# Patient Record
Sex: MALE | Employment: OTHER | URBAN - METROPOLITAN AREA
[De-identification: names, ages, dates, MRNs, and addresses within clinical notes are randomized per-mention and may not be internally consistent; named-entity substitution may affect disease eponyms.]

---

## 2019-12-08 ENCOUNTER — HOSPITAL ENCOUNTER (EMERGENCY)
Age: 79
Discharge: HOME | End: 2019-12-08
Attending: EMERGENCY MEDICINE
Payer: MEDICARE

## 2019-12-08 ENCOUNTER — APPOINTMENT (OUTPATIENT)
Dept: CT IMAGING | Age: 79
End: 2019-12-08
Payer: MEDICARE

## 2019-12-08 VITALS
DIASTOLIC BLOOD PRESSURE: 67 MMHG | TEMPERATURE: 97.4 F | HEART RATE: 100 BPM | BODY MASS INDEX: 42 KG/M2 | SYSTOLIC BLOOD PRESSURE: 141 MMHG | OXYGEN SATURATION: 96 % | HEIGHT: 71 IN | WEIGHT: 300 LBS | RESPIRATION RATE: 17 BRPM

## 2019-12-08 DIAGNOSIS — R00.0 TACHYCARDIA: ICD-10-CM

## 2019-12-08 DIAGNOSIS — R07.9 CHEST PAIN, UNSPECIFIED TYPE: Primary | ICD-10-CM

## 2019-12-08 LAB
ANION GAP SERPL CALCULATED.3IONS-SCNC: 21 MMOL/L (ref 7–16)
APTT: 40.8 SEC (ref 24.5–35.1)
BASOPHILS ABSOLUTE: 0.06 E9/L (ref 0–0.2)
BASOPHILS RELATIVE PERCENT: 0.5 % (ref 0–2)
BUN BLDV-MCNC: 26 MG/DL (ref 8–23)
CALCIUM SERPL-MCNC: 9.7 MG/DL (ref 8.6–10.2)
CHLORIDE BLD-SCNC: 101 MMOL/L (ref 98–107)
CO2: 17 MMOL/L (ref 22–29)
CO2: 20 MMOL/L (ref 22–29)
CREAT SERPL-MCNC: 1.1 MG/DL (ref 0.7–1.2)
EOSINOPHILS ABSOLUTE: 0.2 E9/L (ref 0.05–0.5)
EOSINOPHILS RELATIVE PERCENT: 1.6 % (ref 0–6)
GFR AFRICAN AMERICAN: >60
GFR AFRICAN AMERICAN: >60
GFR NON-AFRICAN AMERICAN: >60 ML/MIN/1.73
GFR NON-AFRICAN AMERICAN: >60 ML/MIN/1.73
GLUCOSE BLD-MCNC: 277 MG/DL (ref 74–99)
GLUCOSE BLD-MCNC: 285 MG/DL (ref 74–99)
HCT VFR BLD CALC: 47.5 % (ref 37–54)
HEMOGLOBIN: 15.7 G/DL (ref 12.5–16.5)
IMMATURE GRANULOCYTES #: 0.1 E9/L
IMMATURE GRANULOCYTES %: 0.8 % (ref 0–5)
INR BLD: 0.9
LYMPHOCYTES ABSOLUTE: 4.61 E9/L (ref 1.5–4)
LYMPHOCYTES RELATIVE PERCENT: 37.7 % (ref 20–42)
MCH RBC QN AUTO: 30.1 PG (ref 26–35)
MCHC RBC AUTO-ENTMCNC: 33.1 % (ref 32–34.5)
MCV RBC AUTO: 91 FL (ref 80–99.9)
MONOCYTES ABSOLUTE: 1.03 E9/L (ref 0.1–0.95)
MONOCYTES RELATIVE PERCENT: 8.4 % (ref 2–12)
NEUTROPHILS ABSOLUTE: 6.22 E9/L (ref 1.8–7.3)
NEUTROPHILS RELATIVE PERCENT: 51 % (ref 43–80)
PDW BLD-RTO: 13.5 FL (ref 11.5–15)
PLATELET # BLD: 248 E9/L (ref 130–450)
PMV BLD AUTO: 10.6 FL (ref 7–12)
POC ANION GAP: 10 MMOL/L (ref 7–16)
POC BUN: 32 MG/DL (ref 8–23)
POC CHLORIDE: 106 MMOL/L (ref 100–108)
POC CREATININE: 1.1 MG/DL (ref 0.7–1.2)
POC POTASSIUM: 4.2 MMOL/L (ref 3.5–5)
POC SODIUM: 136 MMOL/L (ref 132–146)
POTASSIUM REFLEX MAGNESIUM: 4.4 MMOL/L (ref 3.5–5)
PRO-BNP: 52 PG/ML (ref 0–450)
PROTHROMBIN TIME: 10.4 SEC (ref 9.3–12.4)
RBC # BLD: 5.22 E12/L (ref 3.8–5.8)
SODIUM BLD-SCNC: 139 MMOL/L (ref 132–146)
T4 TOTAL: 7.5 MCG/DL (ref 4.5–11.7)
TROPONIN: <0.01 NG/ML (ref 0–0.03)
TSH SERPL DL<=0.05 MIU/L-ACNC: 8.5 UIU/ML (ref 0.27–4.2)
WBC # BLD: 12.2 E9/L (ref 4.5–11.5)

## 2019-12-08 PROCEDURE — 36415 COLL VENOUS BLD VENIPUNCTURE: CPT

## 2019-12-08 PROCEDURE — 82565 ASSAY OF CREATININE: CPT

## 2019-12-08 PROCEDURE — 85610 PROTHROMBIN TIME: CPT

## 2019-12-08 PROCEDURE — 6360000004 HC RX CONTRAST MEDICATION: Performed by: RADIOLOGY

## 2019-12-08 PROCEDURE — 82947 ASSAY GLUCOSE BLOOD QUANT: CPT

## 2019-12-08 PROCEDURE — 2580000003 HC RX 258: Performed by: EMERGENCY MEDICINE

## 2019-12-08 PROCEDURE — 84443 ASSAY THYROID STIM HORMONE: CPT

## 2019-12-08 PROCEDURE — 83880 ASSAY OF NATRIURETIC PEPTIDE: CPT

## 2019-12-08 PROCEDURE — 99285 EMERGENCY DEPT VISIT HI MDM: CPT

## 2019-12-08 PROCEDURE — 80051 ELECTROLYTE PANEL: CPT

## 2019-12-08 PROCEDURE — 93005 ELECTROCARDIOGRAM TRACING: CPT | Performed by: EMERGENCY MEDICINE

## 2019-12-08 PROCEDURE — 85025 COMPLETE CBC W/AUTO DIFF WBC: CPT

## 2019-12-08 PROCEDURE — 84484 ASSAY OF TROPONIN QUANT: CPT

## 2019-12-08 PROCEDURE — 71275 CT ANGIOGRAPHY CHEST: CPT

## 2019-12-08 PROCEDURE — 84520 ASSAY OF UREA NITROGEN: CPT

## 2019-12-08 PROCEDURE — 80048 BASIC METABOLIC PNL TOTAL CA: CPT

## 2019-12-08 PROCEDURE — 85730 THROMBOPLASTIN TIME PARTIAL: CPT

## 2019-12-08 PROCEDURE — 84436 ASSAY OF TOTAL THYROXINE: CPT

## 2019-12-08 RX ORDER — IBUPROFEN 200 MG
200 TABLET ORAL EVERY 6 HOURS PRN
COMMUNITY

## 2019-12-08 RX ORDER — 0.9 % SODIUM CHLORIDE 0.9 %
1000 INTRAVENOUS SOLUTION INTRAVENOUS ONCE
Status: COMPLETED | OUTPATIENT
Start: 2019-12-08 | End: 2019-12-08

## 2019-12-08 RX ORDER — METFORMIN HYDROCHLORIDE 500 MG/1
1000 TABLET, EXTENDED RELEASE ORAL
COMMUNITY

## 2019-12-08 RX ORDER — LISINOPRIL AND HYDROCHLOROTHIAZIDE 20; 12.5 MG/1; MG/1
1 TABLET ORAL DAILY
COMMUNITY

## 2019-12-08 RX ORDER — OMEPRAZOLE 40 MG/1
40 CAPSULE, DELAYED RELEASE ORAL DAILY
COMMUNITY

## 2019-12-08 RX ORDER — GLIPIZIDE 5 MG/1
5 TABLET ORAL
COMMUNITY

## 2019-12-08 RX ORDER — ASPIRIN 81 MG/1
81 TABLET, CHEWABLE ORAL DAILY
COMMUNITY

## 2019-12-08 RX ADMIN — SODIUM CHLORIDE 1000 ML: 9 INJECTION, SOLUTION INTRAVENOUS at 18:17

## 2019-12-08 RX ADMIN — IOPAMIDOL 80 ML: 755 INJECTION, SOLUTION INTRAVENOUS at 18:33

## 2019-12-09 LAB
EKG ATRIAL RATE: 106 BPM
EKG P AXIS: 71 DEGREES
EKG P-R INTERVAL: 192 MS
EKG Q-T INTERVAL: 322 MS
EKG QRS DURATION: 86 MS
EKG QTC CALCULATION (BAZETT): 427 MS
EKG R AXIS: 41 DEGREES
EKG T AXIS: 39 DEGREES
EKG VENTRICULAR RATE: 106 BPM

## 2019-12-09 PROCEDURE — 93010 ELECTROCARDIOGRAM REPORT: CPT | Performed by: INTERNAL MEDICINE

## 2023-05-03 LAB — SARS-COV-2 RESULT: NOT DETECTED

## 2023-09-01 PROBLEM — Z98.890: Status: ACTIVE | Noted: 2023-09-01

## 2023-09-01 PROBLEM — C77.9 MALIGNANT MELANOMA METASTATIC TO LYMPH NODE (MULTI): Status: ACTIVE | Noted: 2023-09-01

## 2023-09-01 PROBLEM — C43.4 MALIGNANT MELANOMA OF HEAD AND NECK (MULTI): Status: ACTIVE | Noted: 2023-09-01

## 2023-09-01 RX ORDER — METFORMIN HYDROCHLORIDE 500 MG/1
500 TABLET ORAL
COMMUNITY
End: 2024-05-24 | Stop reason: SDUPTHER

## 2023-09-01 RX ORDER — ATORVASTATIN CALCIUM 10 MG/1
10 TABLET, FILM COATED ORAL DAILY
COMMUNITY

## 2023-09-01 RX ORDER — LISINOPRIL 20 MG/1
20 TABLET ORAL DAILY
COMMUNITY

## 2023-09-01 RX ORDER — GLIPIZIDE 5 MG/1
5 TABLET ORAL
COMMUNITY
End: 2024-06-06 | Stop reason: ALTCHOICE

## 2023-09-01 RX ORDER — METOPROLOL SUCCINATE 100 MG/1
100 TABLET, EXTENDED RELEASE ORAL DAILY
COMMUNITY

## 2023-09-01 RX ORDER — METOPROLOL SUCCINATE 50 MG/1
50 TABLET, EXTENDED RELEASE ORAL DAILY
COMMUNITY

## 2023-09-12 ENCOUNTER — OFFICE VISIT (OUTPATIENT)
Dept: PRIMARY CARE | Facility: CLINIC | Age: 83
End: 2023-09-12
Payer: MEDICARE

## 2023-09-12 VITALS
HEIGHT: 72 IN | OXYGEN SATURATION: 99 % | TEMPERATURE: 96.8 F | DIASTOLIC BLOOD PRESSURE: 84 MMHG | WEIGHT: 289 LBS | BODY MASS INDEX: 39.14 KG/M2 | RESPIRATION RATE: 16 BRPM | HEART RATE: 74 BPM | SYSTOLIC BLOOD PRESSURE: 126 MMHG

## 2023-09-12 DIAGNOSIS — I48.91 ATRIAL FIBRILLATION, UNSPECIFIED TYPE (MULTI): ICD-10-CM

## 2023-09-12 DIAGNOSIS — I10 PRIMARY HYPERTENSION: ICD-10-CM

## 2023-09-12 DIAGNOSIS — E11.9 TYPE 2 DIABETES MELLITUS WITHOUT COMPLICATION, WITHOUT LONG-TERM CURRENT USE OF INSULIN (MULTI): Primary | ICD-10-CM

## 2023-09-12 DIAGNOSIS — E66.9 CLASS 2 OBESITY WITHOUT SERIOUS COMORBIDITY WITH BODY MASS INDEX (BMI) OF 39.0 TO 39.9 IN ADULT, UNSPECIFIED OBESITY TYPE: ICD-10-CM

## 2023-09-12 DIAGNOSIS — E78.49 OTHER HYPERLIPIDEMIA: ICD-10-CM

## 2023-09-12 DIAGNOSIS — M25.511 BILATERAL SHOULDER PAIN, UNSPECIFIED CHRONICITY: ICD-10-CM

## 2023-09-12 DIAGNOSIS — Z76.89 ENCOUNTER TO ESTABLISH CARE WITH NEW DOCTOR: ICD-10-CM

## 2023-09-12 DIAGNOSIS — M25.512 BILATERAL SHOULDER PAIN, UNSPECIFIED CHRONICITY: ICD-10-CM

## 2023-09-12 PROCEDURE — 3079F DIAST BP 80-89 MM HG: CPT | Performed by: STUDENT IN AN ORGANIZED HEALTH CARE EDUCATION/TRAINING PROGRAM

## 2023-09-12 PROCEDURE — 1125F AMNT PAIN NOTED PAIN PRSNT: CPT | Performed by: STUDENT IN AN ORGANIZED HEALTH CARE EDUCATION/TRAINING PROGRAM

## 2023-09-12 PROCEDURE — 1159F MED LIST DOCD IN RCRD: CPT | Performed by: STUDENT IN AN ORGANIZED HEALTH CARE EDUCATION/TRAINING PROGRAM

## 2023-09-12 PROCEDURE — 99203 OFFICE O/P NEW LOW 30 MIN: CPT | Performed by: STUDENT IN AN ORGANIZED HEALTH CARE EDUCATION/TRAINING PROGRAM

## 2023-09-12 PROCEDURE — 1036F TOBACCO NON-USER: CPT | Performed by: STUDENT IN AN ORGANIZED HEALTH CARE EDUCATION/TRAINING PROGRAM

## 2023-09-12 PROCEDURE — 3074F SYST BP LT 130 MM HG: CPT | Performed by: STUDENT IN AN ORGANIZED HEALTH CARE EDUCATION/TRAINING PROGRAM

## 2023-09-12 PROCEDURE — 1160F RVW MEDS BY RX/DR IN RCRD: CPT | Performed by: STUDENT IN AN ORGANIZED HEALTH CARE EDUCATION/TRAINING PROGRAM

## 2023-09-12 RX ORDER — DULAGLUTIDE 0.75 MG/.5ML
0.75 INJECTION, SOLUTION SUBCUTANEOUS
COMMUNITY
Start: 2023-07-15 | End: 2024-02-19 | Stop reason: SDUPTHER

## 2023-09-12 SDOH — ECONOMIC STABILITY: FOOD INSECURITY: WITHIN THE PAST 12 MONTHS, THE FOOD YOU BOUGHT JUST DIDN'T LAST AND YOU DIDN'T HAVE MONEY TO GET MORE.: NEVER TRUE

## 2023-09-12 SDOH — ECONOMIC STABILITY: FOOD INSECURITY: WITHIN THE PAST 12 MONTHS, YOU WORRIED THAT YOUR FOOD WOULD RUN OUT BEFORE YOU GOT MONEY TO BUY MORE.: NEVER TRUE

## 2023-09-12 ASSESSMENT — ENCOUNTER SYMPTOMS
COLOR CHANGE: 0
NAUSEA: 0
ARTHRALGIAS: 1
COUGH: 0
CHILLS: 0
PALPITATIONS: 0
ABDOMINAL PAIN: 0
SHORTNESS OF BREATH: 0
OCCASIONAL FEELINGS OF UNSTEADINESS: 1
WHEEZING: 0
CONFUSION: 0
VOMITING: 0
CONSTIPATION: 0
HEADACHES: 0
FATIGUE: 0
DIARRHEA: 0
UNEXPECTED WEIGHT CHANGE: 0
DEPRESSION: 0
LOSS OF SENSATION IN FEET: 0
DIZZINESS: 0
FEVER: 0

## 2023-09-12 ASSESSMENT — LIFESTYLE VARIABLES
AUDIT-C TOTAL SCORE: 1
HOW MANY STANDARD DRINKS CONTAINING ALCOHOL DO YOU HAVE ON A TYPICAL DAY: 1 OR 2
SKIP TO QUESTIONS 9-10: 1
HOW OFTEN DO YOU HAVE SIX OR MORE DRINKS ON ONE OCCASION: NEVER
HOW OFTEN DO YOU HAVE A DRINK CONTAINING ALCOHOL: MONTHLY OR LESS

## 2023-09-12 ASSESSMENT — PATIENT HEALTH QUESTIONNAIRE - PHQ9
1. LITTLE INTEREST OR PLEASURE IN DOING THINGS: NOT AT ALL
SUM OF ALL RESPONSES TO PHQ9 QUESTIONS 1 & 2: 0
2. FEELING DOWN, DEPRESSED OR HOPELESS: NOT AT ALL

## 2023-09-12 ASSESSMENT — PAIN SCALES - GENERAL: PAINLEVEL: 6

## 2023-09-12 NOTE — PROGRESS NOTES
Subjective   Patient ID: Ji Naidu is a 83 y.o. male who presents for New Patient Visit and Pain (Muscles and joints started right deltoid moved to left side-unable to comb hair no injury reported. ).    HPI   He is a new pt to us here to estb care and also for FU visit. 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 Reports he is having bl shoulder pain, mostly with ROM; going for few months; denies trauma; taking tylenol with some help but its getting sl worse in the last week. Never d/ the issues with another provider before.     # DM2  - last A1c 7.5 (04/2023)   - takes metformin 500 mg 2 tabs BID, glipizide 5mg bid, Jardiance 25 mg daily & trulicity 0.75 mg wkly     #HTN/HLD # A. Fib   - io /84   - takes lisinopril 10 mg daily   - was on statin but not tat the moment d/t having R shoulder pain; reports no improvement after stopping the statin  - he takes Eliquis 5 mg bid; seeing cards in [place]; has upcoming appt in 11/2023.     Review of Systems   Constitutional:  Negative for chills, fatigue, fever and unexpected weight change.   HENT: Negative.     Respiratory:  Negative for cough, shortness of breath and wheezing.    Cardiovascular:  Negative for chest pain, palpitations and leg swelling.   Gastrointestinal:  Negative for abdominal pain, constipation, diarrhea, nausea and vomiting.   Musculoskeletal:  Positive for arthralgias.   Skin:  Negative for color change and rash.   Neurological:  Negative for dizziness and headaches.   Psychiatric/Behavioral:  Negative for behavioral problems and confusion.        Objective   /84 (BP Location: Right arm, Patient Position: Sitting, BP Cuff Size: Adult)   Pulse 74   Temp 36 °C (96.8 °F) (Temporal)   Resp 16   Ht 1.829 m (6')   Wt 131 kg (289 lb)   SpO2 99%   BMI 39.20 kg/m²     Physical Exam  Vitals and nursing note reviewed.   Constitutional:       Appearance:  Normal appearance. He is obese.   Eyes:      Extraocular Movements: Extraocular movements intact.      Pupils: Pupils are equal, round, and reactive to light.   Cardiovascular:      Rate and Rhythm: Normal rate and regular rhythm.      Pulses: Normal pulses.      Heart sounds: Normal heart sounds.   Pulmonary:      Effort: Pulmonary effort is normal. No respiratory distress.      Breath sounds: Normal breath sounds.   Abdominal:      General: Abdomen is flat. Bowel sounds are normal.      Palpations: Abdomen is soft.   Musculoskeletal:         General: Normal range of motion.      Comments: Bl shoulder: no swelling or bruise noted; ROM limited d/t pain on bl shoulders; mild but diffuse ttp around the shoulder. Neurovasc nl; strength nl.    Neurological:      General: No focal deficit present.      Mental Status: He is alert and oriented to person, place, and time.   Psychiatric:         Mood and Affect: Mood normal.         Behavior: Behavior normal.         Assessment/Plan   He is a new pt to us here to estb care and also for FU visit. 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   Appears that he is having ongoing bl shoulder pain, more stiffness and having issues with all ROMs, likely tendonitis vs frozen shoulder vs other; adv to start tylenol 500 mg 2-3 x daily for pain; provided a referral to see PT; enc stretching exercises at home; adv to see us sooner if sx worsens or seek ER care. May consider Xray vs referral to see ortho at NOV>   He has A.fib and possible remote MI; rec to cont all cardiac meds as per emr; cont AC as rx'd, UTD on refills at the moment. Will put referral to estb care with our cards at [facility] (prev seeing cards in [place], [place]). He is otherwise clinically & vitally stable. Cont all diabetic meds as rx'd for now, we may optimize as indicated at NOV; repeat A1c and other BW as listed below prior to  the NOV in 12/23.     # DM2  - last A1c 7.5 (04/2023)   - cont metformin 500 mg 2 tabs BID, glipizide 5mg bid, Jardiance 25 mg daily & trulicity 0.75 mg wkly     #HTN/HLD # A. Fib   - well controlled   - cont lisinopril 10 mg daily & metop  mg nightly & 50 mg in am.   - cont Ac, Eliquis 5 mg bid as usual; provided cards referral as above   - rec to restart on statin as rx'd     Problem List Items Addressed This Visit    None  Visit Diagnoses       Type 2 diabetes mellitus without complication, without long-term current use of insulin (CMS/[facility])    -  Primary    Relevant Orders    Hemoglobin A1c    Lipid Panel    Atrial fibrillation, unspecified type (CMS/[facility])        Relevant Orders    Referral to Cardiology    Primary hypertension        Relevant Orders    Referral to Cardiology    Other hyperlipidemia        Relevant Orders    Lipid Panel    Bilateral shoulder pain, unspecified chronicity        Relevant Orders    Referral to Physical Therapy    Encounter to establish care with new doctor        Class 2 obesity without serious comorbidity with body mass index (BMI) of 39.0 to 39.9 in adult, unspecified obesity type              Rtc 3 mo for MCR/FU    Ismael Myers MD    [facility]

## 2023-09-24 VITALS — BODY MASS INDEX: 40.83 KG/M2 | HEIGHT: 71 IN | WEIGHT: 291.67 LBS

## 2023-09-24 DIAGNOSIS — Z51.12 ENCOUNTER FOR ANTINEOPLASTIC IMMUNOTHERAPY: ICD-10-CM

## 2023-09-24 DIAGNOSIS — C77.9 MALIGNANT MELANOMA METASTATIC TO LYMPH NODE (MULTI): Primary | ICD-10-CM

## 2023-10-09 ENCOUNTER — APPOINTMENT (OUTPATIENT)
Dept: HEMATOLOGY/ONCOLOGY | Facility: CLINIC | Age: 83
End: 2023-10-09
Payer: MEDICARE

## 2023-10-24 DIAGNOSIS — Z51.12 ENCOUNTER FOR ANTINEOPLASTIC IMMUNOTHERAPY: ICD-10-CM

## 2023-10-24 DIAGNOSIS — C77.9 MALIGNANT MELANOMA METASTATIC TO LYMPH NODE (MULTI): Primary | ICD-10-CM

## 2023-10-24 RX ORDER — PROCHLORPERAZINE MALEATE 10 MG
10 TABLET ORAL EVERY 6 HOURS PRN
Status: CANCELLED | OUTPATIENT
Start: 2023-10-24

## 2023-10-24 RX ORDER — ALBUTEROL SULFATE 0.83 MG/ML
3 SOLUTION RESPIRATORY (INHALATION) AS NEEDED
Status: CANCELLED | OUTPATIENT
Start: 2023-10-24

## 2023-10-24 RX ORDER — EPINEPHRINE 0.3 MG/.3ML
0.3 INJECTION SUBCUTANEOUS EVERY 5 MIN PRN
Status: CANCELLED | OUTPATIENT
Start: 2023-10-24

## 2023-10-24 RX ORDER — FAMOTIDINE 10 MG/ML
20 INJECTION INTRAVENOUS ONCE AS NEEDED
Status: CANCELLED | OUTPATIENT
Start: 2023-10-24

## 2023-10-24 RX ORDER — PROCHLORPERAZINE EDISYLATE 5 MG/ML
10 INJECTION INTRAMUSCULAR; INTRAVENOUS EVERY 6 HOURS PRN
Status: CANCELLED | OUTPATIENT
Start: 2023-10-24

## 2023-10-24 RX ORDER — DIPHENHYDRAMINE HYDROCHLORIDE 50 MG/ML
50 INJECTION INTRAMUSCULAR; INTRAVENOUS AS NEEDED
Status: CANCELLED | OUTPATIENT
Start: 2023-10-24

## 2023-10-25 DIAGNOSIS — C77.9 MALIGNANT MELANOMA METASTATIC TO LYMPH NODE (MULTI): Primary | ICD-10-CM

## 2023-10-25 DIAGNOSIS — Z51.12 ENCOUNTER FOR ANTINEOPLASTIC IMMUNOTHERAPY: ICD-10-CM

## 2023-10-25 RX ORDER — HEPARIN 100 UNIT/ML
500 SYRINGE INTRAVENOUS AS NEEDED
Status: CANCELLED | OUTPATIENT
Start: 2023-10-25

## 2023-10-25 RX ORDER — EPINEPHRINE 0.3 MG/.3ML
0.3 INJECTION SUBCUTANEOUS EVERY 5 MIN PRN
Status: CANCELLED | OUTPATIENT
Start: 2023-10-30

## 2023-10-25 RX ORDER — FAMOTIDINE 10 MG/ML
20 INJECTION INTRAVENOUS ONCE AS NEEDED
Status: CANCELLED | OUTPATIENT
Start: 2023-10-30

## 2023-10-25 RX ORDER — HEPARIN SODIUM,PORCINE/PF 10 UNIT/ML
50 SYRINGE (ML) INTRAVENOUS AS NEEDED
Status: CANCELLED | OUTPATIENT
Start: 2023-10-25

## 2023-10-25 RX ORDER — PROCHLORPERAZINE EDISYLATE 5 MG/ML
10 INJECTION INTRAMUSCULAR; INTRAVENOUS EVERY 6 HOURS PRN
Status: CANCELLED | OUTPATIENT
Start: 2023-10-30

## 2023-10-25 RX ORDER — PROCHLORPERAZINE MALEATE 10 MG
10 TABLET ORAL EVERY 6 HOURS PRN
Status: CANCELLED | OUTPATIENT
Start: 2023-10-30

## 2023-10-25 RX ORDER — DIPHENHYDRAMINE HYDROCHLORIDE 50 MG/ML
50 INJECTION INTRAMUSCULAR; INTRAVENOUS AS NEEDED
Status: CANCELLED | OUTPATIENT
Start: 2023-10-30

## 2023-10-25 RX ORDER — ALBUTEROL SULFATE 0.83 MG/ML
3 SOLUTION RESPIRATORY (INHALATION) AS NEEDED
Status: CANCELLED | OUTPATIENT
Start: 2023-10-30

## 2023-10-30 ENCOUNTER — APPOINTMENT (OUTPATIENT)
Dept: HEMATOLOGY/ONCOLOGY | Facility: CLINIC | Age: 83
End: 2023-10-30
Payer: MEDICARE

## 2023-11-09 ENCOUNTER — APPOINTMENT (OUTPATIENT)
Dept: RADIOLOGY | Facility: HOSPITAL | Age: 83
End: 2023-11-09
Payer: MEDICARE

## 2023-11-13 ENCOUNTER — OFFICE VISIT (OUTPATIENT)
Dept: HEMATOLOGY/ONCOLOGY | Facility: CLINIC | Age: 83
End: 2023-11-13
Payer: MEDICARE

## 2023-11-13 VITALS
HEIGHT: 72 IN | BODY MASS INDEX: 37.27 KG/M2 | RESPIRATION RATE: 16 BRPM | WEIGHT: 275.13 LBS | HEART RATE: 100 BPM | DIASTOLIC BLOOD PRESSURE: 85 MMHG | OXYGEN SATURATION: 96 % | TEMPERATURE: 97.5 F | SYSTOLIC BLOOD PRESSURE: 125 MMHG

## 2023-11-13 DIAGNOSIS — C77.9 MALIGNANT MELANOMA METASTATIC TO LYMPH NODE (MULTI): ICD-10-CM

## 2023-11-13 DIAGNOSIS — Z51.12 ENCOUNTER FOR ANTINEOPLASTIC IMMUNOTHERAPY: ICD-10-CM

## 2023-11-13 LAB
ALBUMIN SERPL BCP-MCNC: 4.1 G/DL (ref 3.4–5)
ALP SERPL-CCNC: 90 U/L (ref 33–136)
ALT SERPL W P-5'-P-CCNC: 9 U/L (ref 10–52)
ANION GAP SERPL CALC-SCNC: 19 MMOL/L (ref 10–20)
AST SERPL W P-5'-P-CCNC: 21 U/L (ref 9–39)
BASOPHILS # BLD AUTO: 0.04 X10*3/UL (ref 0–0.1)
BASOPHILS NFR BLD AUTO: 0.4 %
BILIRUB SERPL-MCNC: 0.9 MG/DL (ref 0–1.2)
BUN SERPL-MCNC: 15 MG/DL (ref 6–23)
CALCIUM SERPL-MCNC: 9.2 MG/DL (ref 8.6–10.3)
CHLORIDE SERPL-SCNC: 102 MMOL/L (ref 98–107)
CO2 SERPL-SCNC: 19 MMOL/L (ref 21–32)
CREAT SERPL-MCNC: 0.94 MG/DL (ref 0.5–1.3)
EOSINOPHIL # BLD AUTO: 0.16 X10*3/UL (ref 0–0.4)
EOSINOPHIL NFR BLD AUTO: 1.7 %
ERYTHROCYTE [DISTWIDTH] IN BLOOD BY AUTOMATED COUNT: 13.9 % (ref 11.5–14.5)
GFR SERPL CREATININE-BSD FRML MDRD: 81 ML/MIN/1.73M*2
GLUCOSE SERPL-MCNC: 123 MG/DL (ref 74–99)
HCT VFR BLD AUTO: 45.2 % (ref 41–52)
HGB BLD-MCNC: 15.1 G/DL (ref 13.5–17.5)
IMM GRANULOCYTES # BLD AUTO: 0.04 X10*3/UL (ref 0–0.5)
IMM GRANULOCYTES NFR BLD AUTO: 0.4 % (ref 0–0.9)
LYMPHOCYTES # BLD AUTO: 1.96 X10*3/UL (ref 0.8–3)
LYMPHOCYTES NFR BLD AUTO: 21.3 %
MCH RBC QN AUTO: 29.8 PG (ref 26–34)
MCHC RBC AUTO-ENTMCNC: 33.4 G/DL (ref 32–36)
MCV RBC AUTO: 89 FL (ref 80–100)
MONOCYTES # BLD AUTO: 0.66 X10*3/UL (ref 0.05–0.8)
MONOCYTES NFR BLD AUTO: 7.2 %
NEUTROPHILS # BLD AUTO: 6.35 X10*3/UL (ref 1.6–5.5)
NEUTROPHILS NFR BLD AUTO: 69 %
PLATELET # BLD AUTO: 256 X10*3/UL (ref 150–450)
POTASSIUM SERPL-SCNC: 4.4 MMOL/L (ref 3.5–5.3)
PROT SERPL-MCNC: 7 G/DL (ref 6.4–8.2)
RBC # BLD AUTO: 5.07 X10*6/UL (ref 4.5–5.9)
SODIUM SERPL-SCNC: 136 MMOL/L (ref 136–145)
T4 FREE SERPL-MCNC: 0.86 NG/DL (ref 0.61–1.12)
TSH SERPL-ACNC: 12.17 MIU/L (ref 0.44–3.98)
WBC # BLD AUTO: 9.2 X10*3/UL (ref 4.4–11.3)

## 2023-11-13 PROCEDURE — 36415 COLL VENOUS BLD VENIPUNCTURE: CPT | Performed by: INTERNAL MEDICINE

## 2023-11-13 PROCEDURE — 1125F AMNT PAIN NOTED PAIN PRSNT: CPT | Performed by: INTERNAL MEDICINE

## 2023-11-13 PROCEDURE — 99212 OFFICE O/P EST SF 10 MIN: CPT | Performed by: INTERNAL MEDICINE

## 2023-11-13 PROCEDURE — 1160F RVW MEDS BY RX/DR IN RCRD: CPT | Performed by: INTERNAL MEDICINE

## 2023-11-13 PROCEDURE — 99212 OFFICE O/P EST SF 10 MIN: CPT | Mod: PO | Performed by: INTERNAL MEDICINE

## 2023-11-13 PROCEDURE — 1159F MED LIST DOCD IN RCRD: CPT | Performed by: INTERNAL MEDICINE

## 2023-11-13 PROCEDURE — 1036F TOBACCO NON-USER: CPT | Performed by: INTERNAL MEDICINE

## 2023-11-13 ASSESSMENT — COLUMBIA-SUICIDE SEVERITY RATING SCALE - C-SSRS
2. HAVE YOU ACTUALLY HAD ANY THOUGHTS OF KILLING YOURSELF?: NO
1. IN THE PAST MONTH, HAVE YOU WISHED YOU WERE DEAD OR WISHED YOU COULD GO TO SLEEP AND NOT WAKE UP?: NO
6. HAVE YOU EVER DONE ANYTHING, STARTED TO DO ANYTHING, OR PREPARED TO DO ANYTHING TO END YOUR LIFE?: NO

## 2023-11-13 ASSESSMENT — PATIENT HEALTH QUESTIONNAIRE - PHQ9
1. LITTLE INTEREST OR PLEASURE IN DOING THINGS: NOT AT ALL
SUM OF ALL RESPONSES TO PHQ9 QUESTIONS 1 AND 2: 0
2. FEELING DOWN, DEPRESSED OR HOPELESS: NOT AT ALL

## 2023-11-13 NOTE — PROGRESS NOTES
Patient Visit Information:   Visit Type: Follow Up Visit      Cancer History:   Treatment Synopsis:    1.  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      This time patient presented with a left parotid gland mass      February 9, 2023 CAT scan of the neck did show 1.5 cm mass arising from or adjacent to the superficial lobe of the left parotid..     March 1, 2023, left parotid gland mass biopsy positive for melanoma.  Preauricular skin biopsy positive for invasive squamous cell carcinoma extending to deep margin      ENT evaluation done        April 16, 2023, PET scan, slight abnormal metabolic activity of left parotid gland seen, no evidence of metastatic disease      MRI brain negative for metastatic disease      May 5,   2023, status post left parotidectomy and left neck dissection      Final pathology did show metastatic melanoma measuring 2 cm surgical margin 1 mm and level 2, level 3, lymph node dissection, no evidence of lymph node metastatic disease, total 42 lymph node examined.      Current treatment, pembrolizumab, started June 26, 2023        History of Present Illness:      ID Statement:    MARIA INES MORTON is a 82 year old Male        Chief Complaint: Melanoma of left parotid gland   Interval History:    Patient is a 82-year-old gentleman with history of hypertension, type 2 diabetes mellitus, hypercholesterolemia, atrial fibrillation and melanoma of left ear which  were diagnosed 2030.      This time patient presented with a left parotid gland mass      February 9, 2023 CAT  scan of the neck did show 1.5 cm mass arising from or adjacent to the superficial lobe of the left parotid..     March 1, 2023, left parotid gland mass biopsy positive for melanoma.  Preauricular skin biopsy positive for invasive squamous cell carcinoma extending to deep margin      ENT evaluation done      April 16, 2023, PET scan, slight abnormal metabolic activity of left parotid gland seen, no evidence of metastatic disease      MRI brain negative for metastatic disease      May 5,   2023, status post left parotidectomy and left neck dissection      Final pathology did show metastatic melanoma measuring 2 cm surgical margin 1 mm and level 2, level 3, lymph node dissection, no evidence of lymph node metastatic disease, total 42 lymph node examined.         Medical oncology consultation is requesting for recurrent melanoma treatment.      Postoperatively patient doing very well, patient is still active, no headache and dizziness, no bony pain, no chest pain, no shortness of breath, no hemoptysis, no nausea vomiting, no abdominal pain, no diarrhea and constipation, no dysuria or hematuria,  patient has some arthritis      PET scan, pathology MRI result discussed with patient     11/13/23     Metastatic melanoma, current treatment pembrolizumab, after second dose of pembrolizumab patient developed severe swelling of both hands with arthritis and severe muscular pain.  Swelling of the both hands was very severe and patient could not do daily activities advised by discomfort due to arthritis and swelling.        Review of Systems:   Review of Systems:    As mentioned above        Allergies and Intolerances:       Allergies:         No Known Allergies: Active     Outpatient Medication Profile:  * Patient Currently Takes Medications as of 07-Aug-2023 14:35 documented in Structured Notes         atorvastatin 10 mg oral tablet : Last Dose Taken:  , 0.5 tab(s) orally once a day (at bedtime)         apixaban 5 mg oral  tablet: Last Dose Taken:  , 1 tab(s) orally 2 times  a day         glipiZIDE 5 mg oral tablet: Last Dose Taken:  , 1 tab(s) orally 2 times  a day         lisinopril 20 mg oral tablet: Last Dose Taken:  , 1 tab(s) orally once  a day         metFORMIN 500 mg oral tablet: Last Dose Taken:  , 2 tab(s) orally 2 times  a day         metoprolol succinate 100 mg oral tablet, extended release: Last Dose  Taken:  , 1 tab(s) orally once a day in the evening          metoprolol succinate 50 mg oral tablet, extended release: Last Dose Taken:   , 1 tab(s) orally once a day in the morning          multivitamin Multiple Vitamins oral tablet: Last Dose Taken:  , 0.5 tab(s)  orally once a day             Medical History:         Metastatic melanoma: ICD-10: C43.9, Status: Active         Metastatic melanoma: ICD-10: C43.9, Status: Active         Malignant neoplasm of parotid gland: ICD-10: C07, Status:  Active         Head and neck cancer: ICD-10: C76.0, Status: Active         Squamous cell carcinoma of skin of sideburn: ICD-10: C44.329,  Status: Active         Hypercholesterolemia: ICD-10: E78.00, Status: Active         Diabetes mellitus: ICD-10: E11.9, Status: Active         Hypertension: ICD-10: I10, Status: Active         Metastatic melanoma to parotid gland: ICD-10: C79.89, Status:  Active         Malignant melanoma of skin of left ear and external auditory canal : ICD-10: C43.22, Status: Active     Family History: No Family History items are recorded  in the problem list.      Social History:   Social Substance History:  ·  Smoking Status unknown if ever smoked (1)            Vitals and Measurements:   Vitals: Temp: 36.5  HR: 84  RR: 16  BP: 147/93  SPO2%:   96   Measurements: HT(cm): 181.2  WT(kg): 134.4  BSA:  2.6  BMI:  40.9   Last 3 Weights & Heights: Date:                           Weight/Scale Type:                    Height:   07-Aug-2023 14:33                134.4  kg                     181.2  cm  17-Jul-2023 08:26                 135  kg                     181.2  cm  26-Jun-2023 08:25                136.4  kg                     181.2  cm      Physical Exam:      Constitutional: awake/alert/oriented x3, no distress,   Eyes: PERRL, EOMI, clear sclera   ENMT: mucous membranes moist, no apparent injury,  no lesions seen   Head/Neck: Neck supple, status post left parotidectomy  and neck dissection, well-healed surgical scar, no cervical mass or lymphadenopathy   Respiratory/Thorax: Patent airways, CTAB, normal  breath sounds   Cardiovascular: Regular, rate and rhythm,   normal  S 1and S 2   Gastrointestinal: Nondistended, soft, non-tender,   , no masses palpable,  +BS,   Musculoskeletal: ROM intact, no joint swelling,   Extremities: normal extremities, finger joint swelling, some tenderness, no redness   Neurological: alert and oriented x3, intact senses,  motor, response and reflexes, normal strength   Lymphatic: No significant lymphadenopathy   Psychological: Appropriate mood and behavior   Skin: Warm and dry, no lesions, no rashes         Lab Results:       Ref Range & Units 10:37  (11/13/23) 1 mo ago  (9/18/23) 2 mo ago  (8/28/23) 3 mo ago  (8/4/23) 3 mo ago  (7/17/23) 4 mo ago  (6/23/23) 4 mo ago  (6/23/23)   WBC  4.4 - 11.3 x10*3/uL 9.2 8.6 R 7.6 R 6.8 R 6.8 R 6.7 R CANCELED R, CM   RBC  4.50 - 5.90 x10*6/uL 5.07 5.00 R 4.95 R 4.83 R 4.69 R 4.79 R CANCELED R, CM   Hemoglobin  13.5 - 17.5 g/dL 15.1 15.1 15.0 15.0 14.8 14.9 CANCELED R, CM   Hematocrit  41.0 - 52.0 % 45.2 44.8 44.9 44.2 43.5 44.9 CANCELED R, CM   MCV  80 - 100 fL 89 90 91 92 93 94 CANCELED R, CM   MCH  26.0 - 34.0 pg 29.8         MCHC  32.0 - 36.0 g/dL 33.4 33.7 33.4 33.9 34.0 33.2 CANCELED R, CM   RDW  11.5 - 14.5 % 13.9 13.0 12.7 12.9 13.1 13.7 CANCELED R, CM   Platelets  150 - 450 x10*3/uL 256           ·  Results            Assessment and Plan:   Assessment:     1.  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      This time patient presented with a left parotid gland mass      February 9, 2023 CAT scan of the neck did show 1.5 cm mass arising from or adjacent to the superficial lobe of the left parotid..     March 1, 2023, left parotid gland mass biopsy positive for melanoma.  Preauricular skin biopsy positive for invasive squamous cell carcinoma extending to deep margin      ENT evaluation done        April 16, 2023, PET scan, slight abnormal metabolic activity of left parotid gland seen, no evidence of metastatic disease      MRI brain negative for metastatic disease      May 5,   2023, status post left parotidectomy and left neck dissection      Final pathology did show metastatic melanoma measuring 2 cm surgical margin 1 mm and level 2, level 3, lymph node dissection, no evidence of lymph node metastatic disease, total 42 lymph node examined.      Plan ,   Pathology report discussed with the patient patient has a recurrent melanoma involving left parotid gland status post left parotidectomy on the basis of PET scan there is no evidence of distant metastatic disease.  Therapy included pembrolizumab possible  benefit and side effect discussed with the patient.  Basic information about immunotherapy also provided to patient.  Consent obtained and first dose of pembrolizumab will be started on June 26, 2023.  Patient will receive immunotherapy for 1 year.      11/13/23      1.  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  Pain was very severe and patient was very discomfort not able to do daily activity.  Most probably patient has acute arthritis due to immunotherapy.  We will hold immunotherapy at this time and reevaluation after 2 months.     Time spent 20 minutes

## 2023-11-14 LAB — CORTIS AM PEAK SERPL-MSCNC: 22.1 UG/DL (ref 5–20)

## 2023-11-15 LAB — ACTH PLAS-MCNC: 19.2 PG/ML (ref 7.2–63.3)

## 2023-11-20 ENCOUNTER — APPOINTMENT (OUTPATIENT)
Dept: HEMATOLOGY/ONCOLOGY | Facility: CLINIC | Age: 83
End: 2023-11-20
Payer: MEDICARE

## 2023-12-11 ENCOUNTER — APPOINTMENT (OUTPATIENT)
Dept: HEMATOLOGY/ONCOLOGY | Facility: CLINIC | Age: 83
End: 2023-12-11
Payer: MEDICARE

## 2023-12-14 ENCOUNTER — APPOINTMENT (OUTPATIENT)
Dept: PRIMARY CARE | Facility: CLINIC | Age: 83
End: 2023-12-14
Payer: MEDICARE

## 2024-01-02 ENCOUNTER — APPOINTMENT (OUTPATIENT)
Dept: HEMATOLOGY/ONCOLOGY | Facility: CLINIC | Age: 84
End: 2024-01-02
Payer: MEDICARE

## 2024-01-15 ENCOUNTER — TELEPHONE (OUTPATIENT)
Dept: HEMATOLOGY/ONCOLOGY | Facility: CLINIC | Age: 84
End: 2024-01-15
Payer: MEDICARE

## 2024-01-18 ENCOUNTER — APPOINTMENT (OUTPATIENT)
Dept: HEMATOLOGY/ONCOLOGY | Facility: CLINIC | Age: 84
End: 2024-01-18
Payer: MEDICARE

## 2024-01-22 ENCOUNTER — APPOINTMENT (OUTPATIENT)
Dept: HEMATOLOGY/ONCOLOGY | Facility: CLINIC | Age: 84
End: 2024-01-22
Payer: MEDICARE

## 2024-01-29 DIAGNOSIS — C77.9 MALIGNANT MELANOMA METASTATIC TO LYMPH NODE (MULTI): Primary | ICD-10-CM

## 2024-01-31 ENCOUNTER — OFFICE VISIT (OUTPATIENT)
Dept: HEMATOLOGY/ONCOLOGY | Facility: CLINIC | Age: 84
End: 2024-01-31
Payer: MEDICARE

## 2024-01-31 VITALS
HEIGHT: 72 IN | DIASTOLIC BLOOD PRESSURE: 87 MMHG | HEART RATE: 76 BPM | BODY MASS INDEX: 36.46 KG/M2 | OXYGEN SATURATION: 97 % | RESPIRATION RATE: 16 BRPM | SYSTOLIC BLOOD PRESSURE: 131 MMHG | TEMPERATURE: 97 F | WEIGHT: 269.18 LBS

## 2024-01-31 DIAGNOSIS — Z51.12 ENCOUNTER FOR ANTINEOPLASTIC IMMUNOTHERAPY: ICD-10-CM

## 2024-01-31 DIAGNOSIS — C77.9 MALIGNANT MELANOMA METASTATIC TO LYMPH NODE (MULTI): ICD-10-CM

## 2024-01-31 LAB
ALBUMIN SERPL BCP-MCNC: 4.1 G/DL (ref 3.4–5)
ALP SERPL-CCNC: 93 U/L (ref 33–136)
ALT SERPL W P-5'-P-CCNC: 8 U/L (ref 10–52)
ANION GAP SERPL CALC-SCNC: 14 MMOL/L (ref 10–20)
AST SERPL W P-5'-P-CCNC: 17 U/L (ref 9–39)
BASOPHILS # BLD AUTO: 0.04 X10*3/UL (ref 0–0.1)
BASOPHILS NFR BLD AUTO: 0.5 %
BILIRUB SERPL-MCNC: 0.9 MG/DL (ref 0–1.2)
BUN SERPL-MCNC: 12 MG/DL (ref 6–23)
CALCIUM SERPL-MCNC: 9.1 MG/DL (ref 8.6–10.3)
CHLORIDE SERPL-SCNC: 104 MMOL/L (ref 98–107)
CO2 SERPL-SCNC: 23 MMOL/L (ref 21–32)
CREAT SERPL-MCNC: 0.81 MG/DL (ref 0.5–1.3)
EGFRCR SERPLBLD CKD-EPI 2021: 89 ML/MIN/1.73M*2
EOSINOPHIL # BLD AUTO: 0.21 X10*3/UL (ref 0–0.4)
EOSINOPHIL NFR BLD AUTO: 2.8 %
ERYTHROCYTE [DISTWIDTH] IN BLOOD BY AUTOMATED COUNT: 14.4 % (ref 11.5–14.5)
GLUCOSE SERPL-MCNC: 141 MG/DL (ref 74–99)
HCT VFR BLD AUTO: 43.9 % (ref 41–52)
HGB BLD-MCNC: 14.3 G/DL (ref 13.5–17.5)
IMM GRANULOCYTES # BLD AUTO: 0.03 X10*3/UL (ref 0–0.5)
IMM GRANULOCYTES NFR BLD AUTO: 0.4 % (ref 0–0.9)
LYMPHOCYTES # BLD AUTO: 1.8 X10*3/UL (ref 0.8–3)
LYMPHOCYTES NFR BLD AUTO: 23.6 %
MCH RBC QN AUTO: 29.1 PG (ref 26–34)
MCHC RBC AUTO-ENTMCNC: 32.6 G/DL (ref 32–36)
MCV RBC AUTO: 89 FL (ref 80–100)
MONOCYTES # BLD AUTO: 0.6 X10*3/UL (ref 0.05–0.8)
MONOCYTES NFR BLD AUTO: 7.9 %
NEUTROPHILS # BLD AUTO: 4.95 X10*3/UL (ref 1.6–5.5)
NEUTROPHILS NFR BLD AUTO: 64.8 %
PLATELET # BLD AUTO: 220 X10*3/UL (ref 150–450)
POTASSIUM SERPL-SCNC: 4 MMOL/L (ref 3.5–5.3)
PROT SERPL-MCNC: 7 G/DL (ref 6.4–8.2)
RBC # BLD AUTO: 4.91 X10*6/UL (ref 4.5–5.9)
SODIUM SERPL-SCNC: 137 MMOL/L (ref 136–145)
WBC # BLD AUTO: 7.6 X10*3/UL (ref 4.4–11.3)

## 2024-01-31 PROCEDURE — 1123F ACP DISCUSS/DSCN MKR DOCD: CPT | Performed by: INTERNAL MEDICINE

## 2024-01-31 PROCEDURE — 99214 OFFICE O/P EST MOD 30 MIN: CPT | Performed by: INTERNAL MEDICINE

## 2024-01-31 PROCEDURE — 36415 COLL VENOUS BLD VENIPUNCTURE: CPT | Performed by: INTERNAL MEDICINE

## 2024-01-31 PROCEDURE — 1036F TOBACCO NON-USER: CPT | Performed by: INTERNAL MEDICINE

## 2024-01-31 PROCEDURE — 1125F AMNT PAIN NOTED PAIN PRSNT: CPT | Performed by: INTERNAL MEDICINE

## 2024-01-31 PROCEDURE — 1159F MED LIST DOCD IN RCRD: CPT | Performed by: INTERNAL MEDICINE

## 2024-01-31 RX ORDER — DEXAMETHASONE 4 MG/1
4 TABLET ORAL DAILY
Qty: 7 TABLET | Refills: 1 | Status: SHIPPED | OUTPATIENT
Start: 2024-01-31 | End: 2024-02-10

## 2024-01-31 ASSESSMENT — PATIENT HEALTH QUESTIONNAIRE - PHQ9
2. FEELING DOWN, DEPRESSED OR HOPELESS: NOT AT ALL
SUM OF ALL RESPONSES TO PHQ9 QUESTIONS 1 AND 2: 0
1. LITTLE INTEREST OR PLEASURE IN DOING THINGS: NOT AT ALL

## 2024-01-31 NOTE — PROGRESS NOTES
Patient Visit Information:   Visit Type: Follow Up Visit      Cancer History:   Treatment Synopsis:    1.  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      This time patient presented with a left parotid gland mass      February 9, 2023 CAT scan of the neck did show 1.5 cm mass arising from or adjacent to the superficial lobe of the left parotid..     March 1, 2023, left parotid gland mass biopsy positive for melanoma.  Preauricular skin biopsy positive for invasive squamous cell carcinoma extending to deep margin      ENT evaluation done        April 16, 2023, PET scan, slight abnormal metabolic activity of left parotid gland seen, no evidence of metastatic disease      MRI brain negative for metastatic disease      May 5,   2023, status post left parotidectomy and left neck dissection      Final pathology did show metastatic melanoma measuring 2 cm surgical margin 1 mm and level 2, level 3, lymph node dissection, no evidence of lymph node metastatic disease, total 42 lymph node examined.      Current treatment, pembrolizumab, started June 26, 2023  Status post 5 doses of pembrolizumab, stopped in November 2023 because of acute arthritis     History of Present Illness:      ID Statement:    MARIA INES MORTON is a 82 year old Male        Chief Complaint: Melanoma of left parotid gland   Interval History:    Patient is a 82-year-old gentleman with history of hypertension, type 2 diabetes mellitus, hypercholesterolemia, atrial fibrillation and melanoma of left ear which  were diagnosed 2030.       This time patient presented with a left parotid gland mass      February 9, 2023 CAT scan of the neck did show 1.5 cm mass arising from or adjacent to the superficial lobe of the left parotid..     March 1, 2023, left parotid gland mass biopsy positive for melanoma.  Preauricular skin biopsy positive for invasive squamous cell carcinoma extending to deep margin      ENT evaluation done      April 16, 2023, PET scan, slight abnormal metabolic activity of left parotid gland seen, no evidence of metastatic disease      MRI brain negative for metastatic disease      May 5,   2023, status post left parotidectomy and left neck dissection      Final pathology did show metastatic melanoma measuring 2 cm surgical margin 1 mm and level 2, level 3, lymph node dissection, no evidence of lymph node metastatic disease, total 42 lymph node examined.         Medical oncology consultation is requesting for recurrent melanoma treatment.      Postoperatively patient doing very well, patient is still active, no headache and dizziness, no bony pain, no chest pain, no shortness of breath, no hemoptysis, no nausea vomiting, no abdominal pain, no diarrhea and constipation, no dysuria or hematuria,  patient has some arthritis      PET scan, pathology MRI result discussed with patient     1/31/24     Metastatic melanoma, current treatment pembrolizumab, after second dose of pembrolizumab patient developed severe swelling of both hands with arthritis and severe muscular pain.  Pembrolizumab was stopped.  Patient still complaining of joint pain shoulder pain and swelling of small joint       Review of Systems:   Review of Systems:    As mentioned above        Allergies and Intolerances:       Allergies:         No Known Allergies: Active     Outpatient Medication Profile:  * Patient Currently Takes Medications as of 07-Aug-2023 14:35 documented in Structured Notes         atorvastatin 10 mg oral tablet : Last Dose Taken:  , 0.5 tab(s) orally  once a day (at bedtime)         apixaban 5 mg oral tablet: Last Dose Taken:  , 1 tab(s) orally 2 times  a day         glipiZIDE 5 mg oral tablet: Last Dose Taken:  , 1 tab(s) orally 2 times  a day         lisinopril 20 mg oral tablet: Last Dose Taken:  , 1 tab(s) orally once  a day         metFORMIN 500 mg oral tablet: Last Dose Taken:  , 2 tab(s) orally 2 times  a day         metoprolol succinate 100 mg oral tablet, extended release: Last Dose  Taken:  , 1 tab(s) orally once a day in the evening          metoprolol succinate 50 mg oral tablet, extended release: Last Dose Taken:   , 1 tab(s) orally once a day in the morning          multivitamin Multiple Vitamins oral tablet: Last Dose Taken:  , 0.5 tab(s)  orally once a day             Medical History:         Metastatic melanoma: ICD-10: C43.9, Status: Active         Metastatic melanoma: ICD-10: C43.9, Status: Active         Malignant neoplasm of parotid gland: ICD-10: C07, Status:  Active         Head and neck cancer: ICD-10: C76.0, Status: Active         Squamous cell carcinoma of skin of sideburn: ICD-10: C44.329,  Status: Active         Hypercholesterolemia: ICD-10: E78.00, Status: Active         Diabetes mellitus: ICD-10: E11.9, Status: Active         Hypertension: ICD-10: I10, Status: Active         Metastatic melanoma to parotid gland: ICD-10: C79.89, Status:  Active         Malignant melanoma of skin of left ear and external auditory canal : ICD-10: C43.22, Status: Active     Family History: No Family History items are recorded  in the problem list.      Social History:   Social Substance History:  ·  Smoking Status unknown if ever smoked (1)            Vitals and Measurements:   Vitals: Temp: 36.5  HR: 84  RR: 16  BP: 147/93  SPO2%:   96   Measurements: HT(cm): 181.2  WT(kg): 134.4  BSA:  2.6  BMI:  40.9   Last 3 Weights & Heights: Date:                           Weight/Scale Type:                    Height:   07-Aug-2023 14:33                134.4  kg                      181.2  cm  17-Jul-2023 08:26                135  kg                     181.2  cm  26-Jun-2023 08:25                136.4  kg                     181.2  cm      Physical Exam:      Constitutional: awake/alert/oriented x3, no distress,   Eyes: PERRL, EOMI, clear sclera   ENMT: mucous membranes moist, no apparent injury,  no lesions seen   Head/Neck: Neck supple, status post left parotidectomy  and neck dissection, well-healed surgical scar, no cervical mass or lymphadenopathy   Respiratory/Thorax: Patent airways, CTAB, normal  breath sounds   Cardiovascular: Regular, rate and rhythm,   normal  S 1and S 2   Gastrointestinal: Nondistended, soft, non-tender,   , no masses palpable,  +BS,   Musculoskeletal: ROM intact, no joint swelling,   Extremities: normal extremities, finger joint swelling, some tenderness, no redness   Neurological: alert and oriented x3, intact senses,  motor, response and reflexes, normal strength   Lymphatic: No significant lymphadenopathy   Psychological: Appropriate mood and behavior   Skin: Warm and dry, no lesions, no rashes         Lab Results:      lt Notes            Component  Ref Range & Units 11:21  (1/31/24) 2 mo ago  (11/13/23) 4 mo ago  (9/18/23) 5 mo ago  (8/28/23) 6 mo ago  (8/4/23) 6 mo ago  (7/17/23) 7 mo ago  (6/23/23)   WBC  4.4 - 11.3 x10*3/uL 7.6 9.2 8.6 R 7.6 R 6.8 R 6.8 R 6.7 R   RBC  4.50 - 5.90 x10*6/uL 4.91 5.07 5.00 R 4.95 R 4.83 R 4.69 R 4.79 R   Hemoglobin  13.5 - 17.5 g/dL 14.3 15.1 15.1 15.0 15.0 14.8 14.9   Hematocrit  41.0 - 52.0 % 43.9 45.2 44.8 44.9 44.2 43.5 44.9   MCV  80 - 100 fL 89 89 90 91 92 93 94   MCH  26.0 - 34.0 pg 29.1 29.8        MCHC  32.0 - 36.0 g/dL 32.6 33.4 33.7 33.4 33.9 34.0 33.2   RDW  11.5 - 14.5 % 14.4 13.9 13.0 12.7 12.9 13.1 13.7   Platelets  150 - 450 x10*3/uL 220              ·  Results            Assessment and Plan:   Assessment:     1.  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      This time patient presented with a left parotid gland mass      February 9, 2023 CAT scan of the neck did show 1.5 cm mass arising from or adjacent to the superficial lobe of the left parotid..     March 1, 2023, left parotid gland mass biopsy positive for melanoma.  Preauricular skin biopsy positive for invasive squamous cell carcinoma extending to deep margin      ENT evaluation done        April 16, 2023, PET scan, slight abnormal metabolic activity of left parotid gland seen, no evidence of metastatic disease      MRI brain negative for metastatic disease      May 5,   2023, status post left parotidectomy and left neck dissection      Final pathology did show metastatic melanoma measuring 2 cm surgical margin 1 mm and level 2, level 3, lymph node dissection, no evidence of lymph node metastatic disease, total 42 lymph node examined.      Plan ,   Pathology report discussed with the patient patient has a recurrent melanoma involving left parotid gland status post left parotidectomy on the basis of PET scan there is no evidence of distant metastatic disease.  Therapy included pembrolizumab possible  benefit and side effect discussed with the patient.  Basic information about immunotherapy also provided to patient.  Consent obtained and first dose of pembrolizumab will be started on June 26, 2023.  Patient will receive immunotherapy for 1 year.      1/31/24      1.  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  I will start dexamethasone 4 mg p.o. daily for 1 week.  Patient is also not interested to have PET scan and CAT scan study.  Will continue to monitor clinically.  Follow-up after 3 months.     Time spent 30 minutes

## 2024-02-12 ENCOUNTER — APPOINTMENT (OUTPATIENT)
Dept: HEMATOLOGY/ONCOLOGY | Facility: CLINIC | Age: 84
End: 2024-02-12
Payer: MEDICARE

## 2024-02-15 ENCOUNTER — LAB (OUTPATIENT)
Dept: LAB | Facility: LAB | Age: 84
End: 2024-02-15
Payer: MEDICARE

## 2024-02-15 DIAGNOSIS — E11.9 TYPE 2 DIABETES MELLITUS WITHOUT COMPLICATION, WITHOUT LONG-TERM CURRENT USE OF INSULIN (MULTI): ICD-10-CM

## 2024-02-15 DIAGNOSIS — E78.49 OTHER HYPERLIPIDEMIA: ICD-10-CM

## 2024-02-15 DIAGNOSIS — Z12.5 SCREENING FOR PROSTATE CANCER: ICD-10-CM

## 2024-02-15 LAB
CHOLEST SERPL-MCNC: 146 MG/DL (ref 0–199)
CHOLESTEROL/HDL RATIO: 2.9
EST. AVERAGE GLUCOSE BLD GHB EST-MCNC: 166 MG/DL
HBA1C MFR BLD: 7.4 %
HDLC SERPL-MCNC: 50.1 MG/DL
LDLC SERPL CALC-MCNC: 63 MG/DL
NON HDL CHOLESTEROL: 96 MG/DL (ref 0–149)
TRIGL SERPL-MCNC: 165 MG/DL (ref 0–149)
VLDL: 33 MG/DL (ref 0–40)

## 2024-02-15 PROCEDURE — G0103 PSA SCREENING: HCPCS

## 2024-02-15 PROCEDURE — 80061 LIPID PANEL: CPT

## 2024-02-15 PROCEDURE — 36415 COLL VENOUS BLD VENIPUNCTURE: CPT

## 2024-02-15 PROCEDURE — 83036 HEMOGLOBIN GLYCOSYLATED A1C: CPT

## 2024-02-19 ENCOUNTER — OFFICE VISIT (OUTPATIENT)
Dept: PRIMARY CARE | Facility: CLINIC | Age: 84
End: 2024-02-19
Payer: MEDICARE

## 2024-02-19 VITALS
HEIGHT: 72 IN | WEIGHT: 271 LBS | BODY MASS INDEX: 36.7 KG/M2 | RESPIRATION RATE: 16 BRPM | TEMPERATURE: 96.8 F | OXYGEN SATURATION: 99 % | HEART RATE: 81 BPM | SYSTOLIC BLOOD PRESSURE: 129 MMHG | DIASTOLIC BLOOD PRESSURE: 85 MMHG

## 2024-02-19 DIAGNOSIS — Z71.89 ACP (ADVANCE CARE PLANNING): ICD-10-CM

## 2024-02-19 DIAGNOSIS — E11.9 TYPE 2 DIABETES MELLITUS WITHOUT COMPLICATION, WITHOUT LONG-TERM CURRENT USE OF INSULIN (MULTI): ICD-10-CM

## 2024-02-19 DIAGNOSIS — E78.49 OTHER HYPERLIPIDEMIA: ICD-10-CM

## 2024-02-19 DIAGNOSIS — I10 PRIMARY HYPERTENSION: ICD-10-CM

## 2024-02-19 DIAGNOSIS — Z23 NEED FOR VACCINATION: ICD-10-CM

## 2024-02-19 DIAGNOSIS — Z12.5 SCREENING FOR PROSTATE CANCER: ICD-10-CM

## 2024-02-19 DIAGNOSIS — Z00.00 ROUTINE GENERAL MEDICAL EXAMINATION AT HEALTH CARE FACILITY: Primary | ICD-10-CM

## 2024-02-19 DIAGNOSIS — I48.91 ATRIAL FIBRILLATION, UNSPECIFIED TYPE (MULTI): ICD-10-CM

## 2024-02-19 DIAGNOSIS — R09.82 PND (POST-NASAL DRIP): ICD-10-CM

## 2024-02-19 PROCEDURE — G0439 PPPS, SUBSEQ VISIT: HCPCS | Performed by: STUDENT IN AN ORGANIZED HEALTH CARE EDUCATION/TRAINING PROGRAM

## 2024-02-19 PROCEDURE — 1036F TOBACCO NON-USER: CPT | Performed by: STUDENT IN AN ORGANIZED HEALTH CARE EDUCATION/TRAINING PROGRAM

## 2024-02-19 PROCEDURE — 3079F DIAST BP 80-89 MM HG: CPT | Performed by: STUDENT IN AN ORGANIZED HEALTH CARE EDUCATION/TRAINING PROGRAM

## 2024-02-19 PROCEDURE — 1123F ACP DISCUSS/DSCN MKR DOCD: CPT | Performed by: STUDENT IN AN ORGANIZED HEALTH CARE EDUCATION/TRAINING PROGRAM

## 2024-02-19 PROCEDURE — 3074F SYST BP LT 130 MM HG: CPT | Performed by: STUDENT IN AN ORGANIZED HEALTH CARE EDUCATION/TRAINING PROGRAM

## 2024-02-19 PROCEDURE — G0008 ADMIN INFLUENZA VIRUS VAC: HCPCS | Performed by: STUDENT IN AN ORGANIZED HEALTH CARE EDUCATION/TRAINING PROGRAM

## 2024-02-19 PROCEDURE — 90662 IIV NO PRSV INCREASED AG IM: CPT | Performed by: STUDENT IN AN ORGANIZED HEALTH CARE EDUCATION/TRAINING PROGRAM

## 2024-02-19 PROCEDURE — 1159F MED LIST DOCD IN RCRD: CPT | Performed by: STUDENT IN AN ORGANIZED HEALTH CARE EDUCATION/TRAINING PROGRAM

## 2024-02-19 PROCEDURE — 99214 OFFICE O/P EST MOD 30 MIN: CPT | Performed by: STUDENT IN AN ORGANIZED HEALTH CARE EDUCATION/TRAINING PROGRAM

## 2024-02-19 PROCEDURE — 99497 ADVNCD CARE PLAN 30 MIN: CPT | Performed by: STUDENT IN AN ORGANIZED HEALTH CARE EDUCATION/TRAINING PROGRAM

## 2024-02-19 PROCEDURE — 1170F FXNL STATUS ASSESSED: CPT | Performed by: STUDENT IN AN ORGANIZED HEALTH CARE EDUCATION/TRAINING PROGRAM

## 2024-02-19 PROCEDURE — 1125F AMNT PAIN NOTED PAIN PRSNT: CPT | Performed by: STUDENT IN AN ORGANIZED HEALTH CARE EDUCATION/TRAINING PROGRAM

## 2024-02-19 PROCEDURE — 1160F RVW MEDS BY RX/DR IN RCRD: CPT | Performed by: STUDENT IN AN ORGANIZED HEALTH CARE EDUCATION/TRAINING PROGRAM

## 2024-02-19 RX ORDER — DULAGLUTIDE 0.75 MG/.5ML
0.75 INJECTION, SOLUTION SUBCUTANEOUS
Qty: 6 ML | Refills: 1 | Status: SHIPPED | OUTPATIENT
Start: 2024-02-19 | End: 2024-06-06 | Stop reason: ALTCHOICE

## 2024-02-19 RX ORDER — FLUTICASONE PROPIONATE 50 MCG
1 SPRAY, SUSPENSION (ML) NASAL NIGHTLY
Qty: 16 G | Refills: 2 | Status: SHIPPED | OUTPATIENT
Start: 2024-02-19 | End: 2025-02-18

## 2024-02-19 SDOH — ECONOMIC STABILITY: FOOD INSECURITY: WITHIN THE PAST 12 MONTHS, THE FOOD YOU BOUGHT JUST DIDN'T LAST AND YOU DIDN'T HAVE MONEY TO GET MORE.: NEVER TRUE

## 2024-02-19 SDOH — ECONOMIC STABILITY: FOOD INSECURITY: WITHIN THE PAST 12 MONTHS, YOU WORRIED THAT YOUR FOOD WOULD RUN OUT BEFORE YOU GOT MONEY TO BUY MORE.: NEVER TRUE

## 2024-02-19 ASSESSMENT — LIFESTYLE VARIABLES
HOW OFTEN DO YOU HAVE A DRINK CONTAINING ALCOHOL: MONTHLY OR LESS
HOW OFTEN DO YOU HAVE SIX OR MORE DRINKS ON ONE OCCASION: NEVER
SKIP TO QUESTIONS 9-10: 1
HOW MANY STANDARD DRINKS CONTAINING ALCOHOL DO YOU HAVE ON A TYPICAL DAY: 1 OR 2
AUDIT-C TOTAL SCORE: 1

## 2024-02-19 ASSESSMENT — ENCOUNTER SYMPTOMS
COUGH: 0
NAUSEA: 0
VOMITING: 0
UNEXPECTED WEIGHT CHANGE: 0
DIZZINESS: 0
FATIGUE: 0
OCCASIONAL FEELINGS OF UNSTEADINESS: 0
ABDOMINAL PAIN: 0
CONFUSION: 0
WHEEZING: 0
LOSS OF SENSATION IN FEET: 0
MUSCULOSKELETAL NEGATIVE: 1
DEPRESSION: 0
DIARRHEA: 0
CONSTIPATION: 0
PALPITATIONS: 0
COLOR CHANGE: 0
CHILLS: 0
HEADACHES: 0
FEVER: 0
SHORTNESS OF BREATH: 0

## 2024-02-19 ASSESSMENT — ACTIVITIES OF DAILY LIVING (ADL)
MANAGING_FINANCES: INDEPENDENT
TAKING_MEDICATION: INDEPENDENT
DRESSING: INDEPENDENT
BATHING: INDEPENDENT
GROCERY_SHOPPING: INDEPENDENT
DOING_HOUSEWORK: INDEPENDENT

## 2024-02-19 ASSESSMENT — PATIENT HEALTH QUESTIONNAIRE - PHQ9
2. FEELING DOWN, DEPRESSED OR HOPELESS: NOT AT ALL
1. LITTLE INTEREST OR PLEASURE IN DOING THINGS: NOT AT ALL
SUM OF ALL RESPONSES TO PHQ9 QUESTIONS 1 & 2: 0

## 2024-02-19 NOTE — PROGRESS NOTES
Subjective   Patient ID: Ji Naidu is a 83 y.o. male who presents for Medicare Annual Wellness Visit Subsequent and Follow-up (Still has pain in hands at night from Keytruda. Patient would like to discuss if he still needs to take lipitor.).  He is here for MCW & FU visit. Reports he is doing okay, no acute illness. He is following with Heme/Onc as melvi for h/o neoplasm (malignant melanoma spreading to parotid gland; had surgery 05/05/23 for removal of the gland). He c/o sinus issues with constant clearing of throat.     # DM2  - last A1c 7.5 (04/2023) and recent 7.4 (02/15/24); denies hypoglycemia   - takes metformin 500 mg 2 tabs BID, glipizide 5mg bid, Jardiance 25 mg daily; not taking trulicity 0.75 mg wkly in the last few months as he ran out.    - recently went to see optometrist, was told normal exam w/o diabetic retinopathy on bl eyes.      #HTN/HLD # A. Fib   - io /85  - takes lisinopril 10 mg daily   - takes atorva 10 mg daily; UTD on refills.   - he takes Eliquis 5 mg bid; recently saw cards at [facility].    Past Medical, Surgical, and Family History reviewed and updated in chart.    Reviewed all medications by prescribing practitioner or clinical pharmacist (such as prescriptions, OTCs, herbal therapies and supplements) and documented in the medical record.    HPI  #HM stuffs  Screening tests:  - Colonoscopy (age 45-75): N/A, no bowel issues.   - PSA (age 50 and older): UTD   - Lipid profile: UTD    Primary prevention:  - Flu shot: okay to rec today   - RSV (age > 60): rec to get at pharmacy   - COVID vaccines: declined   - Tdap shot: UTD  - Shingles shot (age >50): rec to get at pharmacy   - Prevnar 20: UTD   - Statin (age 40-65 or high risk): taking     Counseling:   - ETOH (age>18):  occa beer  - Smoking: none       Patient Care Team:  Ismael Myers MD as PCP - General (Family Medicine)  Koki RitterD as Pharmacist (Pharmacy)  Roxanne Morrell MD as Consulting Physician (Hematology  "and Oncology)     Review of Systems   Constitutional:  Negative for chills, fatigue, fever and unexpected weight change.   HENT: Negative.     Respiratory:  Negative for cough, shortness of breath and wheezing.    Cardiovascular:  Negative for chest pain, palpitations and leg swelling.   Gastrointestinal:  Negative for abdominal pain, constipation, diarrhea, nausea and vomiting.   Musculoskeletal: Negative.    Skin:  Negative for color change and rash.   Neurological:  Negative for dizziness and headaches.   Psychiatric/Behavioral:  Negative for behavioral problems and confusion.        Objective   Vitals:  /85 (BP Location: Right arm, Patient Position: Sitting, BP Cuff Size: Adult)   Pulse 81   Temp 36 °C (96.8 °F) (Temporal)   Resp 16   Ht 1.839 m (6' 0.4\")   Wt 123 kg (271 lb)   SpO2 99%   BMI 36.35 kg/m²       Physical Exam  Vitals and nursing note reviewed.   Constitutional:       Appearance: Normal appearance. He is obese.   HENT:      Right Ear: Tympanic membrane normal.      Left Ear: Tympanic membrane normal.   Eyes:      Extraocular Movements: Extraocular movements intact.      Pupils: Pupils are equal, round, and reactive to light.   Cardiovascular:      Rate and Rhythm: Normal rate and regular rhythm.      Pulses: Normal pulses.      Heart sounds: Normal heart sounds.   Pulmonary:      Effort: Pulmonary effort is normal. No respiratory distress.      Breath sounds: Normal breath sounds.   Abdominal:      General: Abdomen is flat. Bowel sounds are normal.      Palpations: Abdomen is soft.   Musculoskeletal:         General: Normal range of motion.   Neurological:      General: No focal deficit present.      Mental Status: He is alert and oriented to person, place, and time.      Cranial Nerves: No cranial nerve deficit.      Sensory: No sensory deficit.   Psychiatric:         Mood and Affect: Mood normal.         Behavior: Behavior normal.       Assessment/Plan   He is here for MCW & FU " visit. Overall doing okay, no major concerns today and is clinically & vitally stable. Cont  following with Heme/Onc as melvi for h/o neoplasm (malignant melanoma spreading to parotid gland; had surgery 05/05/23 for removal of the gland). He likely has sinus issues causing drainage, start flonase daily.  other Plan as follows.      # DM2  - last A1c 7.5 (04/2023) and recent 7.4 (02/15/24); remains stable   - will dec glipizide 5mg bid to once daily; start back on trulicity. Plan to stop glipizide at NOV, pending A1c result.   - cont other meds: metformin 500 mg 2 tabs BID & Jardiance 25 mg daily  - cont following low glycemic food   - repeat A1c as below prior to NOV      #HTN/HLD # A. Fib   - BP at the goal, continue current regimen: lisinopril 10 mg daily   - cont statin per emr, tolerating well   - encourage heart healthy & DASH diet; continue exercise as tolerated, at least 150 mins per wk   - BW per emr, will call for any abn results as indicated; pt made aware   - cont Eliquis 5 mg bid as usual, managed by cards.     # MCR wellness # HM visit   - Discussed with pt; will work on POA/living will paper work   - UTD on immunization per emr: rec'd flu shot    - UTD on age appropriate screenings as listed above in MCR summary   - refer MCR summary above for detail  - BW ordered as listed in emr      Problem List Items Addressed This Visit    None  Visit Diagnoses       Routine general medical examination at health care facility    -  Primary    Type 2 diabetes mellitus without complication, without long-term current use of insulin (CMS/[facility])        Relevant Medications    dulaglutide (Trulicity) 0.75 mg/0.5 mL pen injector    Other Relevant Orders    Hemoglobin A1c    ACP (advance care planning)        Need for vaccination        Relevant Orders    Flu vaccine, high dose seasonal, preservative free (Completed)    Screening for prostate cancer        Relevant Orders    Prostate Specific Antigen, Screen    PND  (post-nasal drip)        Relevant Medications    fluticasone (Flonase) 50 mcg/actuation nasal spray    Primary hypertension        Other hyperlipidemia        Atrial fibrillation, unspecified type (CMS/HCC)              Rtc 3-4 mo for OLGA Myers MD    [place], Family Medicine

## 2024-02-20 LAB — PSA SERPL-MCNC: 0.13 NG/ML

## 2024-03-04 ENCOUNTER — APPOINTMENT (OUTPATIENT)
Dept: HEMATOLOGY/ONCOLOGY | Facility: CLINIC | Age: 84
End: 2024-03-04
Payer: MEDICARE

## 2024-03-25 ENCOUNTER — APPOINTMENT (OUTPATIENT)
Dept: HEMATOLOGY/ONCOLOGY | Facility: CLINIC | Age: 84
End: 2024-03-25
Payer: MEDICARE

## 2024-04-01 ENCOUNTER — APPOINTMENT (OUTPATIENT)
Dept: HEMATOLOGY/ONCOLOGY | Facility: CLINIC | Age: 84
End: 2024-04-01
Payer: MEDICARE

## 2024-04-15 ENCOUNTER — APPOINTMENT (OUTPATIENT)
Dept: HEMATOLOGY/ONCOLOGY | Facility: CLINIC | Age: 84
End: 2024-04-15
Payer: MEDICARE

## 2024-04-29 ENCOUNTER — OFFICE VISIT (OUTPATIENT)
Dept: HEMATOLOGY/ONCOLOGY | Facility: CLINIC | Age: 84
End: 2024-04-29
Payer: MEDICARE

## 2024-04-29 VITALS
RESPIRATION RATE: 16 BRPM | HEIGHT: 72 IN | BODY MASS INDEX: 36.86 KG/M2 | TEMPERATURE: 98.1 F | DIASTOLIC BLOOD PRESSURE: 82 MMHG | OXYGEN SATURATION: 96 % | SYSTOLIC BLOOD PRESSURE: 128 MMHG | HEART RATE: 118 BPM | WEIGHT: 272.16 LBS

## 2024-04-29 DIAGNOSIS — C77.9 MALIGNANT MELANOMA METASTATIC TO LYMPH NODE (MULTI): ICD-10-CM

## 2024-04-29 DIAGNOSIS — Z51.12 ENCOUNTER FOR ANTINEOPLASTIC IMMUNOTHERAPY: Primary | ICD-10-CM

## 2024-04-29 DIAGNOSIS — Z51.12 ENCOUNTER FOR ANTINEOPLASTIC IMMUNOTHERAPY: ICD-10-CM

## 2024-04-29 LAB
ALBUMIN SERPL BCP-MCNC: 4.3 G/DL (ref 3.4–5)
ALP SERPL-CCNC: 102 U/L (ref 33–136)
ALT SERPL W P-5'-P-CCNC: 11 U/L (ref 10–52)
ANION GAP SERPL CALC-SCNC: 13 MMOL/L (ref 10–20)
AST SERPL W P-5'-P-CCNC: 20 U/L (ref 9–39)
BASOPHILS # BLD AUTO: 0.02 X10*3/UL (ref 0–0.1)
BASOPHILS NFR BLD AUTO: 0.2 %
BILIRUB SERPL-MCNC: 0.8 MG/DL (ref 0–1.2)
BUN SERPL-MCNC: 10 MG/DL (ref 6–23)
CALCIUM SERPL-MCNC: 9.2 MG/DL (ref 8.6–10.3)
CHLORIDE SERPL-SCNC: 105 MMOL/L (ref 98–107)
CO2 SERPL-SCNC: 25 MMOL/L (ref 21–32)
CREAT SERPL-MCNC: 1.06 MG/DL (ref 0.5–1.3)
EGFRCR SERPLBLD CKD-EPI 2021: 71 ML/MIN/1.73M*2
EOSINOPHIL # BLD AUTO: 0.19 X10*3/UL (ref 0–0.4)
EOSINOPHIL NFR BLD AUTO: 2.3 %
ERYTHROCYTE [DISTWIDTH] IN BLOOD BY AUTOMATED COUNT: 14 % (ref 11.5–14.5)
GLUCOSE SERPL-MCNC: 148 MG/DL (ref 74–99)
HCT VFR BLD AUTO: 44.7 % (ref 41–52)
HGB BLD-MCNC: 15 G/DL (ref 13.5–17.5)
IMM GRANULOCYTES # BLD AUTO: 0.02 X10*3/UL (ref 0–0.5)
IMM GRANULOCYTES NFR BLD AUTO: 0.2 % (ref 0–0.9)
LYMPHOCYTES # BLD AUTO: 1.74 X10*3/UL (ref 0.8–3)
LYMPHOCYTES NFR BLD AUTO: 21.4 %
MCH RBC QN AUTO: 30.2 PG (ref 26–34)
MCHC RBC AUTO-ENTMCNC: 33.6 G/DL (ref 32–36)
MCV RBC AUTO: 90 FL (ref 80–100)
MONOCYTES # BLD AUTO: 0.49 X10*3/UL (ref 0.05–0.8)
MONOCYTES NFR BLD AUTO: 6 %
NEUTROPHILS # BLD AUTO: 5.68 X10*3/UL (ref 1.6–5.5)
NEUTROPHILS NFR BLD AUTO: 69.9 %
PLATELET # BLD AUTO: 194 X10*3/UL (ref 150–450)
POTASSIUM SERPL-SCNC: 4.3 MMOL/L (ref 3.5–5.3)
PROT SERPL-MCNC: 6.9 G/DL (ref 6.4–8.2)
RBC # BLD AUTO: 4.97 X10*6/UL (ref 4.5–5.9)
SODIUM SERPL-SCNC: 139 MMOL/L (ref 136–145)
WBC # BLD AUTO: 8.1 X10*3/UL (ref 4.4–11.3)

## 2024-04-29 PROCEDURE — 1126F AMNT PAIN NOTED NONE PRSNT: CPT | Performed by: INTERNAL MEDICINE

## 2024-04-29 PROCEDURE — 99214 OFFICE O/P EST MOD 30 MIN: CPT | Performed by: INTERNAL MEDICINE

## 2024-04-29 PROCEDURE — 1160F RVW MEDS BY RX/DR IN RCRD: CPT | Performed by: INTERNAL MEDICINE

## 2024-04-29 PROCEDURE — 1159F MED LIST DOCD IN RCRD: CPT | Performed by: INTERNAL MEDICINE

## 2024-04-29 PROCEDURE — 1123F ACP DISCUSS/DSCN MKR DOCD: CPT | Performed by: INTERNAL MEDICINE

## 2024-04-29 PROCEDURE — 36415 COLL VENOUS BLD VENIPUNCTURE: CPT | Performed by: INTERNAL MEDICINE

## 2024-04-29 RX ORDER — PROCHLORPERAZINE EDISYLATE 5 MG/ML
10 INJECTION INTRAMUSCULAR; INTRAVENOUS EVERY 6 HOURS PRN
OUTPATIENT
Start: 2025-03-14

## 2024-04-29 RX ORDER — PROCHLORPERAZINE MALEATE 10 MG
10 TABLET ORAL EVERY 6 HOURS PRN
OUTPATIENT
Start: 2024-07-05

## 2024-04-29 RX ORDER — EPINEPHRINE 0.3 MG/.3ML
0.3 INJECTION SUBCUTANEOUS EVERY 5 MIN PRN
OUTPATIENT
Start: 2024-09-27

## 2024-04-29 RX ORDER — FAMOTIDINE 10 MG/ML
20 INJECTION INTRAVENOUS ONCE AS NEEDED
OUTPATIENT
Start: 2025-02-14

## 2024-04-29 RX ORDER — ALBUTEROL SULFATE 0.83 MG/ML
3 SOLUTION RESPIRATORY (INHALATION) AS NEEDED
OUTPATIENT
Start: 2025-04-11

## 2024-04-29 RX ORDER — EPINEPHRINE 0.3 MG/.3ML
0.3 INJECTION SUBCUTANEOUS EVERY 5 MIN PRN
OUTPATIENT
Start: 2025-03-14

## 2024-04-29 RX ORDER — DIPHENHYDRAMINE HYDROCHLORIDE 50 MG/ML
50 INJECTION INTRAMUSCULAR; INTRAVENOUS AS NEEDED
OUTPATIENT
Start: 2025-02-14

## 2024-04-29 RX ORDER — PROCHLORPERAZINE EDISYLATE 5 MG/ML
10 INJECTION INTRAMUSCULAR; INTRAVENOUS EVERY 6 HOURS PRN
OUTPATIENT
Start: 2024-11-22

## 2024-04-29 RX ORDER — ALBUTEROL SULFATE 0.83 MG/ML
3 SOLUTION RESPIRATORY (INHALATION) AS NEEDED
OUTPATIENT
Start: 2025-02-14

## 2024-04-29 RX ORDER — PROCHLORPERAZINE EDISYLATE 5 MG/ML
10 INJECTION INTRAMUSCULAR; INTRAVENOUS EVERY 6 HOURS PRN
OUTPATIENT
Start: 2025-04-11

## 2024-04-29 RX ORDER — PROCHLORPERAZINE MALEATE 10 MG
10 TABLET ORAL EVERY 6 HOURS PRN
OUTPATIENT
Start: 2024-09-27

## 2024-04-29 RX ORDER — DIPHENHYDRAMINE HYDROCHLORIDE 50 MG/ML
50 INJECTION INTRAMUSCULAR; INTRAVENOUS AS NEEDED
OUTPATIENT
Start: 2025-03-14

## 2024-04-29 RX ORDER — DIPHENHYDRAMINE HYDROCHLORIDE 50 MG/ML
50 INJECTION INTRAMUSCULAR; INTRAVENOUS AS NEEDED
OUTPATIENT
Start: 2024-12-20

## 2024-04-29 RX ORDER — FAMOTIDINE 10 MG/ML
20 INJECTION INTRAVENOUS ONCE AS NEEDED
OUTPATIENT
Start: 2024-08-30

## 2024-04-29 RX ORDER — FAMOTIDINE 10 MG/ML
20 INJECTION INTRAVENOUS ONCE AS NEEDED
OUTPATIENT
Start: 2025-01-17

## 2024-04-29 RX ORDER — FAMOTIDINE 10 MG/ML
20 INJECTION INTRAVENOUS ONCE AS NEEDED
OUTPATIENT
Start: 2024-08-02

## 2024-04-29 RX ORDER — PROCHLORPERAZINE MALEATE 10 MG
10 TABLET ORAL EVERY 6 HOURS PRN
OUTPATIENT
Start: 2024-10-25

## 2024-04-29 RX ORDER — FAMOTIDINE 10 MG/ML
20 INJECTION INTRAVENOUS ONCE AS NEEDED
OUTPATIENT
Start: 2024-07-05

## 2024-04-29 RX ORDER — DIPHENHYDRAMINE HYDROCHLORIDE 50 MG/ML
50 INJECTION INTRAMUSCULAR; INTRAVENOUS AS NEEDED
OUTPATIENT
Start: 2024-07-05

## 2024-04-29 RX ORDER — FAMOTIDINE 10 MG/ML
20 INJECTION INTRAVENOUS ONCE AS NEEDED
OUTPATIENT
Start: 2024-10-25

## 2024-04-29 RX ORDER — PROCHLORPERAZINE EDISYLATE 5 MG/ML
10 INJECTION INTRAMUSCULAR; INTRAVENOUS EVERY 6 HOURS PRN
Status: CANCELLED | OUTPATIENT
Start: 2024-05-10

## 2024-04-29 RX ORDER — FAMOTIDINE 10 MG/ML
20 INJECTION INTRAVENOUS ONCE AS NEEDED
OUTPATIENT
Start: 2025-03-14

## 2024-04-29 RX ORDER — PROCHLORPERAZINE MALEATE 10 MG
10 TABLET ORAL EVERY 6 HOURS PRN
OUTPATIENT
Start: 2025-02-14

## 2024-04-29 RX ORDER — DIPHENHYDRAMINE HYDROCHLORIDE 50 MG/ML
50 INJECTION INTRAMUSCULAR; INTRAVENOUS AS NEEDED
OUTPATIENT
Start: 2024-08-02

## 2024-04-29 RX ORDER — ALBUTEROL SULFATE 0.83 MG/ML
3 SOLUTION RESPIRATORY (INHALATION) AS NEEDED
Status: CANCELLED | OUTPATIENT
Start: 2024-05-10

## 2024-04-29 RX ORDER — ALBUTEROL SULFATE 0.83 MG/ML
3 SOLUTION RESPIRATORY (INHALATION) AS NEEDED
OUTPATIENT
Start: 2024-07-05

## 2024-04-29 RX ORDER — PROCHLORPERAZINE MALEATE 10 MG
10 TABLET ORAL EVERY 6 HOURS PRN
OUTPATIENT
Start: 2024-08-30

## 2024-04-29 RX ORDER — EPINEPHRINE 0.3 MG/.3ML
0.3 INJECTION SUBCUTANEOUS EVERY 5 MIN PRN
OUTPATIENT
Start: 2025-02-14

## 2024-04-29 RX ORDER — PROCHLORPERAZINE EDISYLATE 5 MG/ML
10 INJECTION INTRAMUSCULAR; INTRAVENOUS EVERY 6 HOURS PRN
OUTPATIENT
Start: 2024-12-20

## 2024-04-29 RX ORDER — PROCHLORPERAZINE EDISYLATE 5 MG/ML
10 INJECTION INTRAMUSCULAR; INTRAVENOUS EVERY 6 HOURS PRN
Status: CANCELLED | OUTPATIENT
Start: 2024-06-07

## 2024-04-29 RX ORDER — EPINEPHRINE 0.3 MG/.3ML
0.3 INJECTION SUBCUTANEOUS EVERY 5 MIN PRN
OUTPATIENT
Start: 2024-08-02

## 2024-04-29 RX ORDER — EPINEPHRINE 0.3 MG/.3ML
0.3 INJECTION SUBCUTANEOUS EVERY 5 MIN PRN
OUTPATIENT
Start: 2024-12-20

## 2024-04-29 RX ORDER — PROCHLORPERAZINE EDISYLATE 5 MG/ML
10 INJECTION INTRAMUSCULAR; INTRAVENOUS EVERY 6 HOURS PRN
OUTPATIENT
Start: 2024-08-30

## 2024-04-29 RX ORDER — EPINEPHRINE 0.3 MG/.3ML
0.3 INJECTION SUBCUTANEOUS EVERY 5 MIN PRN
OUTPATIENT
Start: 2024-07-05

## 2024-04-29 RX ORDER — DIPHENHYDRAMINE HYDROCHLORIDE 50 MG/ML
50 INJECTION INTRAMUSCULAR; INTRAVENOUS AS NEEDED
OUTPATIENT
Start: 2024-11-22

## 2024-04-29 RX ORDER — PROCHLORPERAZINE MALEATE 10 MG
10 TABLET ORAL EVERY 6 HOURS PRN
OUTPATIENT
Start: 2024-12-20

## 2024-04-29 RX ORDER — DIPHENHYDRAMINE HYDROCHLORIDE 50 MG/ML
50 INJECTION INTRAMUSCULAR; INTRAVENOUS AS NEEDED
OUTPATIENT
Start: 2024-08-30

## 2024-04-29 RX ORDER — ALBUTEROL SULFATE 0.83 MG/ML
3 SOLUTION RESPIRATORY (INHALATION) AS NEEDED
Status: CANCELLED | OUTPATIENT
Start: 2024-06-07

## 2024-04-29 RX ORDER — DIPHENHYDRAMINE HYDROCHLORIDE 50 MG/ML
50 INJECTION INTRAMUSCULAR; INTRAVENOUS AS NEEDED
Status: CANCELLED | OUTPATIENT
Start: 2024-06-07

## 2024-04-29 RX ORDER — FAMOTIDINE 10 MG/ML
20 INJECTION INTRAVENOUS ONCE AS NEEDED
Status: CANCELLED | OUTPATIENT
Start: 2024-05-10

## 2024-04-29 RX ORDER — PROCHLORPERAZINE MALEATE 10 MG
10 TABLET ORAL EVERY 6 HOURS PRN
OUTPATIENT
Start: 2025-03-14

## 2024-04-29 RX ORDER — EPINEPHRINE 0.3 MG/.3ML
0.3 INJECTION SUBCUTANEOUS EVERY 5 MIN PRN
OUTPATIENT
Start: 2024-10-25

## 2024-04-29 RX ORDER — PROCHLORPERAZINE EDISYLATE 5 MG/ML
10 INJECTION INTRAMUSCULAR; INTRAVENOUS EVERY 6 HOURS PRN
OUTPATIENT
Start: 2024-08-02

## 2024-04-29 RX ORDER — ALBUTEROL SULFATE 0.83 MG/ML
3 SOLUTION RESPIRATORY (INHALATION) AS NEEDED
OUTPATIENT
Start: 2024-11-22

## 2024-04-29 RX ORDER — PROCHLORPERAZINE MALEATE 10 MG
10 TABLET ORAL EVERY 6 HOURS PRN
OUTPATIENT
Start: 2025-04-11

## 2024-04-29 RX ORDER — FAMOTIDINE 10 MG/ML
20 INJECTION INTRAVENOUS ONCE AS NEEDED
OUTPATIENT
Start: 2025-04-11

## 2024-04-29 RX ORDER — PROCHLORPERAZINE EDISYLATE 5 MG/ML
10 INJECTION INTRAMUSCULAR; INTRAVENOUS EVERY 6 HOURS PRN
OUTPATIENT
Start: 2024-07-05

## 2024-04-29 RX ORDER — EPINEPHRINE 0.3 MG/.3ML
0.3 INJECTION SUBCUTANEOUS EVERY 5 MIN PRN
OUTPATIENT
Start: 2025-04-11

## 2024-04-29 RX ORDER — ALBUTEROL SULFATE 0.83 MG/ML
3 SOLUTION RESPIRATORY (INHALATION) AS NEEDED
OUTPATIENT
Start: 2024-12-20

## 2024-04-29 RX ORDER — FAMOTIDINE 10 MG/ML
20 INJECTION INTRAVENOUS ONCE AS NEEDED
OUTPATIENT
Start: 2024-12-20

## 2024-04-29 RX ORDER — ALBUTEROL SULFATE 0.83 MG/ML
3 SOLUTION RESPIRATORY (INHALATION) AS NEEDED
OUTPATIENT
Start: 2025-03-14

## 2024-04-29 RX ORDER — ALBUTEROL SULFATE 0.83 MG/ML
3 SOLUTION RESPIRATORY (INHALATION) AS NEEDED
OUTPATIENT
Start: 2024-08-30

## 2024-04-29 RX ORDER — DIPHENHYDRAMINE HYDROCHLORIDE 50 MG/ML
50 INJECTION INTRAMUSCULAR; INTRAVENOUS AS NEEDED
OUTPATIENT
Start: 2024-09-27

## 2024-04-29 RX ORDER — EPINEPHRINE 0.3 MG/.3ML
0.3 INJECTION SUBCUTANEOUS EVERY 5 MIN PRN
OUTPATIENT
Start: 2024-08-30

## 2024-04-29 RX ORDER — PROCHLORPERAZINE MALEATE 10 MG
10 TABLET ORAL EVERY 6 HOURS PRN
Status: CANCELLED | OUTPATIENT
Start: 2024-06-07

## 2024-04-29 RX ORDER — PROCHLORPERAZINE MALEATE 10 MG
10 TABLET ORAL EVERY 6 HOURS PRN
OUTPATIENT
Start: 2024-11-22

## 2024-04-29 RX ORDER — PROCHLORPERAZINE MALEATE 10 MG
10 TABLET ORAL EVERY 6 HOURS PRN
OUTPATIENT
Start: 2024-08-02

## 2024-04-29 RX ORDER — ALBUTEROL SULFATE 0.83 MG/ML
3 SOLUTION RESPIRATORY (INHALATION) AS NEEDED
OUTPATIENT
Start: 2024-10-25

## 2024-04-29 RX ORDER — PROCHLORPERAZINE MALEATE 10 MG
10 TABLET ORAL EVERY 6 HOURS PRN
Status: CANCELLED | OUTPATIENT
Start: 2024-05-10

## 2024-04-29 RX ORDER — EPINEPHRINE 0.3 MG/.3ML
0.3 INJECTION SUBCUTANEOUS EVERY 5 MIN PRN
Status: CANCELLED | OUTPATIENT
Start: 2024-06-07

## 2024-04-29 RX ORDER — DIPHENHYDRAMINE HYDROCHLORIDE 50 MG/ML
50 INJECTION INTRAMUSCULAR; INTRAVENOUS AS NEEDED
Status: CANCELLED | OUTPATIENT
Start: 2024-05-10

## 2024-04-29 RX ORDER — DIPHENHYDRAMINE HYDROCHLORIDE 50 MG/ML
50 INJECTION INTRAMUSCULAR; INTRAVENOUS AS NEEDED
OUTPATIENT
Start: 2025-01-17

## 2024-04-29 RX ORDER — DIPHENHYDRAMINE HYDROCHLORIDE 50 MG/ML
50 INJECTION INTRAMUSCULAR; INTRAVENOUS AS NEEDED
OUTPATIENT
Start: 2024-10-25

## 2024-04-29 RX ORDER — PROCHLORPERAZINE EDISYLATE 5 MG/ML
10 INJECTION INTRAMUSCULAR; INTRAVENOUS EVERY 6 HOURS PRN
OUTPATIENT
Start: 2025-02-14

## 2024-04-29 RX ORDER — ALBUTEROL SULFATE 0.83 MG/ML
3 SOLUTION RESPIRATORY (INHALATION) AS NEEDED
OUTPATIENT
Start: 2024-08-02

## 2024-04-29 RX ORDER — PROCHLORPERAZINE EDISYLATE 5 MG/ML
10 INJECTION INTRAMUSCULAR; INTRAVENOUS EVERY 6 HOURS PRN
OUTPATIENT
Start: 2024-10-25

## 2024-04-29 RX ORDER — ALBUTEROL SULFATE 0.83 MG/ML
3 SOLUTION RESPIRATORY (INHALATION) AS NEEDED
OUTPATIENT
Start: 2025-01-17

## 2024-04-29 RX ORDER — EPINEPHRINE 0.3 MG/.3ML
0.3 INJECTION SUBCUTANEOUS EVERY 5 MIN PRN
OUTPATIENT
Start: 2024-11-22

## 2024-04-29 RX ORDER — ALBUTEROL SULFATE 0.83 MG/ML
3 SOLUTION RESPIRATORY (INHALATION) AS NEEDED
OUTPATIENT
Start: 2024-09-27

## 2024-04-29 RX ORDER — EPINEPHRINE 0.3 MG/.3ML
0.3 INJECTION SUBCUTANEOUS EVERY 5 MIN PRN
Status: CANCELLED | OUTPATIENT
Start: 2024-05-10

## 2024-04-29 RX ORDER — FAMOTIDINE 10 MG/ML
20 INJECTION INTRAVENOUS ONCE AS NEEDED
Status: CANCELLED | OUTPATIENT
Start: 2024-06-07

## 2024-04-29 RX ORDER — PROCHLORPERAZINE MALEATE 10 MG
10 TABLET ORAL EVERY 6 HOURS PRN
OUTPATIENT
Start: 2025-01-17

## 2024-04-29 RX ORDER — PROCHLORPERAZINE EDISYLATE 5 MG/ML
10 INJECTION INTRAMUSCULAR; INTRAVENOUS EVERY 6 HOURS PRN
OUTPATIENT
Start: 2024-09-27

## 2024-04-29 RX ORDER — DIPHENHYDRAMINE HYDROCHLORIDE 50 MG/ML
50 INJECTION INTRAMUSCULAR; INTRAVENOUS AS NEEDED
OUTPATIENT
Start: 2025-04-11

## 2024-04-29 RX ORDER — FAMOTIDINE 10 MG/ML
20 INJECTION INTRAVENOUS ONCE AS NEEDED
OUTPATIENT
Start: 2024-11-22

## 2024-04-29 RX ORDER — FAMOTIDINE 10 MG/ML
20 INJECTION INTRAVENOUS ONCE AS NEEDED
OUTPATIENT
Start: 2024-09-27

## 2024-04-29 RX ORDER — EPINEPHRINE 0.3 MG/.3ML
0.3 INJECTION SUBCUTANEOUS EVERY 5 MIN PRN
OUTPATIENT
Start: 2025-01-17

## 2024-04-29 RX ORDER — PROCHLORPERAZINE EDISYLATE 5 MG/ML
10 INJECTION INTRAMUSCULAR; INTRAVENOUS EVERY 6 HOURS PRN
OUTPATIENT
Start: 2025-01-17

## 2024-04-29 ASSESSMENT — NCCN CANCER DISTRESS MANAGEMENT
NCCN PHYSICAL CONCERNS: 2
NCCN PHYSICAL CONCERNS: 1

## 2024-04-29 ASSESSMENT — PAIN SCALES - GENERAL: PAINLEVEL: 0-NO PAIN

## 2024-04-29 NOTE — PATIENT INSTRUCTIONS
Office visit with dr. Morrell, consent for Nivolumab every 4 weeks obtained. Copy of signed consent given to Ji. Lexicomp education sheets given to patient and discussed potential side effects, including but not limited to fatigue, rash, diarrhea, bone marrow suppression. Patient was already familiar with Immunotherapy checkpoint inhibitors.  Cbd, cmp drawn today but he will need to return before treatment for additional lab work.  Plan is to start on 5/10 at 0830, additional labs on 5/6  Plan follow up with dr. Morrell before 2nd cycle on 6/5 at 0900

## 2024-04-29 NOTE — PROGRESS NOTES
Patient Visit Information:   Visit Type: Follow Up Visit      Cancer History:   Treatment Synopsis:    1.  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      This time patient presented with a left parotid gland mass      February 9, 2023 CAT scan of the neck did show 1.5 cm mass arising from or adjacent to the superficial lobe of the left parotid..     March 1, 2023, left parotid gland mass biopsy positive for melanoma.  Preauricular skin biopsy positive for invasive squamous cell carcinoma extending to deep margin      ENT evaluation done        April 16, 2023, PET scan, slight abnormal metabolic activity of left parotid gland seen, no evidence of metastatic disease      MRI brain negative for metastatic disease      May 5,   2023, status post left parotidectomy and left neck dissection      Final pathology did show metastatic melanoma measuring 2 cm surgical margin 1 mm and level 2, level 3, lymph node dissection, no evidence of lymph node metastatic disease, total 42 lymph node examined.      Current treatment, pembrolizumab, started June 26, 2023  Status post 5 doses of pembrolizumab, stopped in November 2023 because of acute arthritis     History of Present Illness:      ID Statement:    MARIA INES MORTON is a 82 year old Male        Chief Complaint: Melanoma of left parotid gland   Interval History:    Patient is a 82-year-old gentleman with history of hypertension, type 2 diabetes mellitus, hypercholesterolemia, atrial fibrillation and melanoma of left ear which  were diagnosed 2030.       This time patient presented with a left parotid gland mass      February 9, 2023 CAT scan of the neck did show 1.5 cm mass arising from or adjacent to the superficial lobe of the left parotid..     March 1, 2023, left parotid gland mass biopsy positive for melanoma.  Preauricular skin biopsy positive for invasive squamous cell carcinoma extending to deep margin      ENT evaluation done      April 16, 2023, PET scan, slight abnormal metabolic activity of left parotid gland seen, no evidence of metastatic disease      MRI brain negative for metastatic disease      May 5,   2023, status post left parotidectomy and left neck dissection      Final pathology did show metastatic melanoma measuring 2 cm surgical margin 1 mm and level 2, level 3, lymph node dissection, no evidence of lymph node metastatic disease, total 42 lymph node examined.         Medical oncology consultation is requesting for recurrent melanoma treatment.      Postoperatively patient doing very well, patient is still active, no headache and dizziness, no bony pain, no chest pain, no shortness of breath, no hemoptysis, no nausea vomiting, no abdominal pain, no diarrhea and constipation, no dysuria or hematuria,  patient has some arthritis      PET scan, pathology MRI result discussed with patient     4/29/24     Metastatic melanoma, current treatment pembrolizumab, after second dose of pembrolizumab patient developed severe swelling of both hands with arthritis and severe muscular pain.  Pembrolizumab was stopped.  Patient still complaining of joint pain shoulder pain and swelling of small joint, today patient comes for follow-up visit.  Noticed to have small nodules above surgical scar on left side of neck.  Patient still has some arthritis no shortness of breath         Review of Systems:   Review of Systems:    As mentioned above        Allergies and Intolerances:       Allergies:         No Known Allergies: Active     Outpatient Medication  Profile:  * Patient Currently Takes Medications as of 07-Aug-2023 14:35 documented in Structured Notes         atorvastatin 10 mg oral tablet : Last Dose Taken:  , 0.5 tab(s) orally once a day (at bedtime)         apixaban 5 mg oral tablet: Last Dose Taken:  , 1 tab(s) orally 2 times  a day         glipiZIDE 5 mg oral tablet: Last Dose Taken:  , 1 tab(s) orally 2 times  a day         lisinopril 20 mg oral tablet: Last Dose Taken:  , 1 tab(s) orally once  a day         metFORMIN 500 mg oral tablet: Last Dose Taken:  , 2 tab(s) orally 2 times  a day         metoprolol succinate 100 mg oral tablet, extended release: Last Dose  Taken:  , 1 tab(s) orally once a day in the evening          metoprolol succinate 50 mg oral tablet, extended release: Last Dose Taken:   , 1 tab(s) orally once a day in the morning          multivitamin Multiple Vitamins oral tablet: Last Dose Taken:  , 0.5 tab(s)  orally once a day             Medical History:         Metastatic melanoma: ICD-10: C43.9, Status: Active         Metastatic melanoma: ICD-10: C43.9, Status: Active         Malignant neoplasm of parotid gland: ICD-10: C07, Status:  Active         Head and neck cancer: ICD-10: C76.0, Status: Active         Squamous cell carcinoma of skin of sideburn: ICD-10: C44.329,  Status: Active         Hypercholesterolemia: ICD-10: E78.00, Status: Active         Diabetes mellitus: ICD-10: E11.9, Status: Active         Hypertension: ICD-10: I10, Status: Active         Metastatic melanoma to parotid gland: ICD-10: C79.89, Status:  Active         Malignant melanoma of skin of left ear and external auditory canal : ICD-10: C43.22, Status: Active     Family History: No Family History items are recorded  in the problem list.      Social History:   Social Substance History:  ·  Smoking Status unknown if ever smoked (1)            Vitals and Measurements:   Vitals: Temp: 36.5  HR: 84  RR: 16  BP: 147/93  SPO2%:   96   Measurements: HT(cm): 181.2  WT(kg):  134.4  BSA:  2.6  BMI:  40.9   Last 3 Weights & Heights: Date:                           Weight/Scale Type:                    Height:   07-Aug-2023 14:33                134.4  kg                     181.2  cm  17-Jul-2023 08:26                135  kg                     181.2  cm  26-Jun-2023 08:25                136.4  kg                     181.2  cm      Physical Exam:      Constitutional: awake/alert/oriented x3, no distress,   Eyes: PERRL, EOMI, clear sclera   ENMT: mucous membranes moist, no apparent injury,  no lesions seen   Head/Neck: Neck supple, status post left parotidectomy  and neck dissection, well-healed surgical scar, small nodule is not noticeable just above surgical scar 1 cm which is nontender and hard and purplish color   Respiratory/Thorax: Patent airways, CTAB, normal  breath sounds   Cardiovascular: Regular, rate and rhythm,   normal  S 1and S 2   Gastrointestinal: Nondistended, soft, non-tender,   , no masses palpable,  +BS,   Musculoskeletal: ROM intact, no joint swelling,   Extremities: normal extremities, finger joint swelling, some tenderness, no redness   Neurological: alert and oriented x3, intact senses,  motor, response and reflexes, normal strength   Lymphatic: No significant lymphadenopathy   Psychological: Appropriate mood and behavior   Skin: Warm and dry, no lesions, no rashes         Lab Results:      lt Notes            Component  Ref Range & Units 11:21  (1/31/24) 2 mo ago  (11/13/23) 4 mo ago  (9/18/23) 5 mo ago  (8/28/23) 6 mo ago  (8/4/23) 6 mo ago  (7/17/23) 7 mo ago  (6/23/23)   WBC  4.4 - 11.3 x10*3/uL 7.6 9.2 8.6 R 7.6 R 6.8 R 6.8 R 6.7 R   RBC  4.50 - 5.90 x10*6/uL 4.91 5.07 5.00 R 4.95 R 4.83 R 4.69 R 4.79 R   Hemoglobin  13.5 - 17.5 g/dL 14.3 15.1 15.1 15.0 15.0 14.8 14.9   Hematocrit  41.0 - 52.0 % 43.9 45.2 44.8 44.9 44.2 43.5 44.9   MCV  80 - 100 fL 89 89 90 91 92 93 94   MCH  26.0 - 34.0 pg 29.1 29.8        MCHC  32.0 - 36.0 g/dL 32.6 33.4 33.7 33.4 33.9 34.0  33.2   RDW  11.5 - 14.5 % 14.4 13.9 13.0 12.7 12.9 13.1 13.7   Platelets  150 - 450 x10*3/uL 220              ·  Results            Assessment and Plan:   Assessment:     1.  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      This time patient presented with a left parotid gland mass      February 9, 2023 CAT scan of the neck did show 1.5 cm mass arising from or adjacent to the superficial lobe of the left parotid..     March 1, 2023, left parotid gland mass biopsy positive for melanoma.  Preauricular skin biopsy positive for invasive squamous cell carcinoma extending to deep margin      ENT evaluation done        April 16, 2023, PET scan, slight abnormal metabolic activity of left parotid gland seen, no evidence of metastatic disease      MRI brain negative for metastatic disease      May 5,   2023, status post left parotidectomy and left neck dissection      Final pathology did show metastatic melanoma measuring 2 cm surgical margin 1 mm and level 2, level 3, lymph node dissection, no evidence of lymph node metastatic disease, total 42 lymph node examined.      Plan ,   Pathology report discussed with the patient patient has a recurrent melanoma involving left parotid gland status post left parotidectomy on the basis of PET scan there is no evidence of distant metastatic disease.  Therapy included pembrolizumab possible  benefit and side effect discussed with the patient.  Basic information about immunotherapy also provided to  patient.  Consent obtained and first dose of pembrolizumab will be started on June 26, 2023.  Patient will receive immunotherapy for 1 year.      4/29/4      1.  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       Most probably dealing with recurrent melanoma.    Definitive treatment option discussed with the patient #1 liver biopsy of nodule #2 schedule for PET scan density biopsy #3 restart immunotherapy.    Dilaudid discussion patient chosed option #3 and he does not want to start Keytruda I will try with nivolumab every 28-day possible side effect discussed with the patient.  I told him it is quite possible you gained 12 acute arthritis due to nivolumab.     Time spent 30 minutes

## 2024-05-06 ENCOUNTER — APPOINTMENT (OUTPATIENT)
Dept: HEMATOLOGY/ONCOLOGY | Facility: CLINIC | Age: 84
End: 2024-05-06
Payer: MEDICARE

## 2024-05-10 ENCOUNTER — INFUSION (OUTPATIENT)
Dept: HEMATOLOGY/ONCOLOGY | Facility: CLINIC | Age: 84
End: 2024-05-10
Payer: MEDICARE

## 2024-05-10 VITALS
HEART RATE: 77 BPM | WEIGHT: 270.9 LBS | SYSTOLIC BLOOD PRESSURE: 131 MMHG | RESPIRATION RATE: 16 BRPM | DIASTOLIC BLOOD PRESSURE: 79 MMHG | HEIGHT: 72 IN | OXYGEN SATURATION: 97 % | BODY MASS INDEX: 36.69 KG/M2 | TEMPERATURE: 97.5 F

## 2024-05-10 DIAGNOSIS — C77.9 MALIGNANT MELANOMA METASTATIC TO LYMPH NODE (MULTI): ICD-10-CM

## 2024-05-10 DIAGNOSIS — Z51.12 ENCOUNTER FOR ANTINEOPLASTIC IMMUNOTHERAPY: ICD-10-CM

## 2024-05-10 LAB
ALBUMIN SERPL BCP-MCNC: 4.2 G/DL (ref 3.4–5)
ALP SERPL-CCNC: 100 U/L (ref 33–136)
ALT SERPL W P-5'-P-CCNC: 11 U/L (ref 10–52)
ANION GAP SERPL CALC-SCNC: 12 MMOL/L (ref 10–20)
AST SERPL W P-5'-P-CCNC: 18 U/L (ref 9–39)
BASOPHILS # BLD AUTO: 0.05 X10*3/UL (ref 0–0.1)
BASOPHILS NFR BLD AUTO: 0.7 %
BILIRUB SERPL-MCNC: 0.7 MG/DL (ref 0–1.2)
BUN SERPL-MCNC: 13 MG/DL (ref 6–23)
CALCIUM SERPL-MCNC: 9.4 MG/DL (ref 8.6–10.3)
CHLORIDE SERPL-SCNC: 104 MMOL/L (ref 98–107)
CO2 SERPL-SCNC: 25 MMOL/L (ref 21–32)
CORTIS AM PEAK SERPL-MSCNC: 18.2 UG/DL (ref 5–20)
CREAT SERPL-MCNC: 0.99 MG/DL (ref 0.5–1.3)
EGFRCR SERPLBLD CKD-EPI 2021: 77 ML/MIN/1.73M*2
EOSINOPHIL # BLD AUTO: 0.17 X10*3/UL (ref 0–0.4)
EOSINOPHIL NFR BLD AUTO: 2.3 %
ERYTHROCYTE [DISTWIDTH] IN BLOOD BY AUTOMATED COUNT: 14.1 % (ref 11.5–14.5)
GLUCOSE SERPL-MCNC: 145 MG/DL (ref 74–99)
HBV CORE AB SER QL: NONREACTIVE
HBV SURFACE AB SER-ACNC: <3.1 MIU/ML
HBV SURFACE AG SERPL QL IA: NONREACTIVE
HCT VFR BLD AUTO: 46.3 % (ref 41–52)
HGB BLD-MCNC: 15.5 G/DL (ref 13.5–17.5)
IMM GRANULOCYTES # BLD AUTO: 0.05 X10*3/UL (ref 0–0.5)
IMM GRANULOCYTES NFR BLD AUTO: 0.7 % (ref 0–0.9)
LYMPHOCYTES # BLD AUTO: 1.82 X10*3/UL (ref 0.8–3)
LYMPHOCYTES NFR BLD AUTO: 24.4 %
MCH RBC QN AUTO: 30 PG (ref 26–34)
MCHC RBC AUTO-ENTMCNC: 33.5 G/DL (ref 32–36)
MCV RBC AUTO: 90 FL (ref 80–100)
MONOCYTES # BLD AUTO: 0.54 X10*3/UL (ref 0.05–0.8)
MONOCYTES NFR BLD AUTO: 7.2 %
NEUTROPHILS # BLD AUTO: 4.84 X10*3/UL (ref 1.6–5.5)
NEUTROPHILS NFR BLD AUTO: 64.7 %
NRBC BLD-RTO: 0 /100 WBCS (ref 0–0)
PLATELET # BLD AUTO: 227 X10*3/UL (ref 150–450)
POTASSIUM SERPL-SCNC: 4.4 MMOL/L (ref 3.5–5.3)
PROT SERPL-MCNC: 7.2 G/DL (ref 6.4–8.2)
RBC # BLD AUTO: 5.16 X10*6/UL (ref 4.5–5.9)
SODIUM SERPL-SCNC: 137 MMOL/L (ref 136–145)
T4 FREE SERPL-MCNC: 0.96 NG/DL (ref 0.61–1.12)
TSH SERPL-ACNC: 12.81 MIU/L (ref 0.44–3.98)
WBC # BLD AUTO: 7.5 X10*3/UL (ref 4.4–11.3)

## 2024-05-10 PROCEDURE — 2500000004 HC RX 250 GENERAL PHARMACY W/ HCPCS (ALT 636 FOR OP/ED): Mod: JZ,JG | Performed by: INTERNAL MEDICINE

## 2024-05-10 PROCEDURE — 82533 TOTAL CORTISOL: CPT | Mod: PORLAB | Performed by: INTERNAL MEDICINE

## 2024-05-10 PROCEDURE — 96413 CHEMO IV INFUSION 1 HR: CPT

## 2024-05-10 PROCEDURE — 84439 ASSAY OF FREE THYROXINE: CPT | Performed by: INTERNAL MEDICINE

## 2024-05-10 PROCEDURE — 82024 ASSAY OF ACTH: CPT

## 2024-05-10 PROCEDURE — 86704 HEP B CORE ANTIBODY TOTAL: CPT | Mod: PORLAB | Performed by: INTERNAL MEDICINE

## 2024-05-10 PROCEDURE — 84443 ASSAY THYROID STIM HORMONE: CPT | Performed by: INTERNAL MEDICINE

## 2024-05-10 PROCEDURE — 80053 COMPREHEN METABOLIC PANEL: CPT | Performed by: INTERNAL MEDICINE

## 2024-05-10 PROCEDURE — 86706 HEP B SURFACE ANTIBODY: CPT | Mod: PORLAB | Performed by: INTERNAL MEDICINE

## 2024-05-10 PROCEDURE — 87340 HEPATITIS B SURFACE AG IA: CPT | Mod: PORLAB | Performed by: INTERNAL MEDICINE

## 2024-05-10 PROCEDURE — 85025 COMPLETE CBC W/AUTO DIFF WBC: CPT | Performed by: INTERNAL MEDICINE

## 2024-05-10 RX ORDER — FAMOTIDINE 10 MG/ML
20 INJECTION INTRAVENOUS ONCE AS NEEDED
Status: DISCONTINUED | OUTPATIENT
Start: 2024-05-10 | End: 2024-05-10 | Stop reason: HOSPADM

## 2024-05-10 RX ORDER — ALBUTEROL SULFATE 0.83 MG/ML
3 SOLUTION RESPIRATORY (INHALATION) AS NEEDED
Status: DISCONTINUED | OUTPATIENT
Start: 2024-05-10 | End: 2024-05-10 | Stop reason: HOSPADM

## 2024-05-10 RX ORDER — HEPARIN SODIUM,PORCINE/PF 10 UNIT/ML
50 SYRINGE (ML) INTRAVENOUS AS NEEDED
OUTPATIENT
Start: 2024-05-10

## 2024-05-10 RX ORDER — DIPHENHYDRAMINE HYDROCHLORIDE 50 MG/ML
50 INJECTION INTRAMUSCULAR; INTRAVENOUS AS NEEDED
Status: DISCONTINUED | OUTPATIENT
Start: 2024-05-10 | End: 2024-05-10 | Stop reason: HOSPADM

## 2024-05-10 RX ORDER — PROCHLORPERAZINE MALEATE 10 MG
10 TABLET ORAL EVERY 6 HOURS PRN
Status: DISCONTINUED | OUTPATIENT
Start: 2024-05-10 | End: 2024-05-10 | Stop reason: HOSPADM

## 2024-05-10 RX ORDER — EPINEPHRINE 0.3 MG/.3ML
0.3 INJECTION SUBCUTANEOUS EVERY 5 MIN PRN
Status: DISCONTINUED | OUTPATIENT
Start: 2024-05-10 | End: 2024-05-10 | Stop reason: HOSPADM

## 2024-05-10 RX ORDER — PROCHLORPERAZINE EDISYLATE 5 MG/ML
10 INJECTION INTRAMUSCULAR; INTRAVENOUS EVERY 6 HOURS PRN
Status: DISCONTINUED | OUTPATIENT
Start: 2024-05-10 | End: 2024-05-10 | Stop reason: HOSPADM

## 2024-05-10 RX ORDER — HEPARIN 100 UNIT/ML
500 SYRINGE INTRAVENOUS AS NEEDED
OUTPATIENT
Start: 2024-05-10

## 2024-05-10 RX ADMIN — SODIUM CHLORIDE 480 MG: 9 INJECTION, SOLUTION INTRAVENOUS at 11:31

## 2024-05-10 ASSESSMENT — PAIN SCALES - GENERAL: PAINLEVEL: 0-NO PAIN

## 2024-05-10 NOTE — SIGNIFICANT EVENT
05/10/24 1058   Prechemo Checklist   Has the patient been in the hospital, ED, or urgent care since last date of service No   Chemo/Immuno Consent Completed and Signed Yes   Protocol/Indications Verified Yes   Confirmed to previous date/time of medication Yes  (first dose)   Compared to previous dose Yes   All medications are dated accurately Yes   Pregnancy Test Negative Not applicable   Parameters Met Yes   BSA/Weight-Height Verified Yes   Dose Calculations Verified (current, total, cumulative) Yes

## 2024-05-10 NOTE — PROGRESS NOTES
Pt here for first dose of nivo. Pt tolerated infusion well. He is aware of plan of care, will call with further questions or concerns. Will return in 4 weeks for next infusion

## 2024-05-12 LAB — ACTH PLAS-MCNC: 26.1 PG/ML (ref 7.2–63.3)

## 2024-05-24 ENCOUNTER — LAB (OUTPATIENT)
Dept: LAB | Facility: LAB | Age: 84
End: 2024-05-24
Payer: MEDICARE

## 2024-05-24 DIAGNOSIS — E11.9 TYPE 2 DIABETES MELLITUS WITHOUT COMPLICATION, WITHOUT LONG-TERM CURRENT USE OF INSULIN (MULTI): Primary | ICD-10-CM

## 2024-05-24 DIAGNOSIS — E11.9 TYPE 2 DIABETES MELLITUS WITHOUT COMPLICATION, WITHOUT LONG-TERM CURRENT USE OF INSULIN (MULTI): ICD-10-CM

## 2024-05-24 LAB
EST. AVERAGE GLUCOSE BLD GHB EST-MCNC: 163 MG/DL
HBA1C MFR BLD: 7.3 %

## 2024-05-24 PROCEDURE — 83036 HEMOGLOBIN GLYCOSYLATED A1C: CPT

## 2024-05-24 PROCEDURE — 36415 COLL VENOUS BLD VENIPUNCTURE: CPT

## 2024-05-28 ENCOUNTER — APPOINTMENT (OUTPATIENT)
Dept: HEMATOLOGY/ONCOLOGY | Facility: CLINIC | Age: 84
End: 2024-05-28
Payer: MEDICARE

## 2024-05-29 ENCOUNTER — APPOINTMENT (OUTPATIENT)
Dept: PRIMARY CARE | Facility: CLINIC | Age: 84
End: 2024-05-29
Payer: MEDICARE

## 2024-05-29 RX ORDER — METFORMIN HYDROCHLORIDE 500 MG/1
500 TABLET ORAL
Qty: 90 TABLET | Refills: 0 | Status: SHIPPED | OUTPATIENT
Start: 2024-05-29 | End: 2024-06-06 | Stop reason: SDUPTHER

## 2024-06-06 ENCOUNTER — OFFICE VISIT (OUTPATIENT)
Dept: PRIMARY CARE | Facility: CLINIC | Age: 84
End: 2024-06-06
Payer: MEDICARE

## 2024-06-06 ENCOUNTER — LAB (OUTPATIENT)
Dept: LAB | Facility: LAB | Age: 84
End: 2024-06-06
Payer: MEDICARE

## 2024-06-06 VITALS
RESPIRATION RATE: 16 BRPM | BODY MASS INDEX: 37.25 KG/M2 | TEMPERATURE: 97.1 F | SYSTOLIC BLOOD PRESSURE: 136 MMHG | HEIGHT: 72 IN | HEART RATE: 80 BPM | DIASTOLIC BLOOD PRESSURE: 88 MMHG | WEIGHT: 275 LBS | OXYGEN SATURATION: 96 %

## 2024-06-06 DIAGNOSIS — Z51.12 ENCOUNTER FOR ANTINEOPLASTIC IMMUNOTHERAPY: ICD-10-CM

## 2024-06-06 DIAGNOSIS — E78.49 OTHER HYPERLIPIDEMIA: ICD-10-CM

## 2024-06-06 DIAGNOSIS — C77.9 MALIGNANT MELANOMA METASTATIC TO LYMPH NODE (MULTI): ICD-10-CM

## 2024-06-06 DIAGNOSIS — I48.91 ATRIAL FIBRILLATION, UNSPECIFIED TYPE (MULTI): ICD-10-CM

## 2024-06-06 DIAGNOSIS — L30.9 ECZEMA, UNSPECIFIED TYPE: ICD-10-CM

## 2024-06-06 DIAGNOSIS — E11.9 TYPE 2 DIABETES MELLITUS WITHOUT COMPLICATION, WITHOUT LONG-TERM CURRENT USE OF INSULIN (MULTI): Primary | ICD-10-CM

## 2024-06-06 DIAGNOSIS — I10 PRIMARY HYPERTENSION: ICD-10-CM

## 2024-06-06 LAB
CORTIS SERPL-MCNC: 15.9 UG/DL (ref 2.5–20)
HBV CORE AB SER QL: NONREACTIVE
HBV SURFACE AB SER-ACNC: <3.1 MIU/ML
HBV SURFACE AG SERPL QL IA: NONREACTIVE
T4 FREE SERPL-MCNC: 0.93 NG/DL (ref 0.61–1.12)
TSH SERPL-ACNC: 10.08 MIU/L (ref 0.44–3.98)

## 2024-06-06 PROCEDURE — 1159F MED LIST DOCD IN RCRD: CPT | Performed by: STUDENT IN AN ORGANIZED HEALTH CARE EDUCATION/TRAINING PROGRAM

## 2024-06-06 PROCEDURE — 1036F TOBACCO NON-USER: CPT | Performed by: STUDENT IN AN ORGANIZED HEALTH CARE EDUCATION/TRAINING PROGRAM

## 2024-06-06 PROCEDURE — 86704 HEP B CORE ANTIBODY TOTAL: CPT

## 2024-06-06 PROCEDURE — 86706 HEP B SURFACE ANTIBODY: CPT

## 2024-06-06 PROCEDURE — 36415 COLL VENOUS BLD VENIPUNCTURE: CPT

## 2024-06-06 PROCEDURE — 82024 ASSAY OF ACTH: CPT

## 2024-06-06 PROCEDURE — 1123F ACP DISCUSS/DSCN MKR DOCD: CPT | Performed by: STUDENT IN AN ORGANIZED HEALTH CARE EDUCATION/TRAINING PROGRAM

## 2024-06-06 PROCEDURE — 82533 TOTAL CORTISOL: CPT

## 2024-06-06 PROCEDURE — 99214 OFFICE O/P EST MOD 30 MIN: CPT | Performed by: STUDENT IN AN ORGANIZED HEALTH CARE EDUCATION/TRAINING PROGRAM

## 2024-06-06 PROCEDURE — 3075F SYST BP GE 130 - 139MM HG: CPT | Performed by: STUDENT IN AN ORGANIZED HEALTH CARE EDUCATION/TRAINING PROGRAM

## 2024-06-06 PROCEDURE — 87340 HEPATITIS B SURFACE AG IA: CPT

## 2024-06-06 PROCEDURE — 84439 ASSAY OF FREE THYROXINE: CPT

## 2024-06-06 PROCEDURE — 3079F DIAST BP 80-89 MM HG: CPT | Performed by: STUDENT IN AN ORGANIZED HEALTH CARE EDUCATION/TRAINING PROGRAM

## 2024-06-06 PROCEDURE — 1160F RVW MEDS BY RX/DR IN RCRD: CPT | Performed by: STUDENT IN AN ORGANIZED HEALTH CARE EDUCATION/TRAINING PROGRAM

## 2024-06-06 PROCEDURE — 84443 ASSAY THYROID STIM HORMONE: CPT

## 2024-06-06 RX ORDER — CLOTRIMAZOLE AND BETAMETHASONE DIPROPIONATE 10; .64 MG/G; MG/G
1 CREAM TOPICAL 2 TIMES DAILY
Qty: 30 G | Refills: 0 | Status: SHIPPED | OUTPATIENT
Start: 2024-06-06

## 2024-06-06 RX ORDER — DULAGLUTIDE 1.5 MG/.5ML
1.5 INJECTION, SOLUTION SUBCUTANEOUS
Qty: 6 ML | Refills: 1 | Status: SHIPPED | OUTPATIENT
Start: 2024-06-09 | End: 2024-06-06 | Stop reason: SDUPTHER

## 2024-06-06 RX ORDER — DULAGLUTIDE 1.5 MG/.5ML
1.5 INJECTION, SOLUTION SUBCUTANEOUS
Qty: 6 ML | Refills: 1 | Status: SHIPPED | OUTPATIENT
Start: 2024-06-09 | End: 2024-06-06

## 2024-06-06 RX ORDER — DULAGLUTIDE 1.5 MG/.5ML
1.5 INJECTION, SOLUTION SUBCUTANEOUS
Qty: 6 ML | Refills: 1 | Status: SHIPPED | OUTPATIENT
Start: 2024-06-09

## 2024-06-06 RX ORDER — METFORMIN HYDROCHLORIDE 500 MG/1
1000 TABLET ORAL
Qty: 360 TABLET | Refills: 1 | Status: SHIPPED | OUTPATIENT
Start: 2024-06-06

## 2024-06-06 RX ORDER — DULAGLUTIDE 1.5 MG/.5ML
1.5 INJECTION, SOLUTION SUBCUTANEOUS
Qty: 6 ML | Refills: 1 | Status: SHIPPED | OUTPATIENT
Start: 2024-06-06 | End: 2024-06-06 | Stop reason: SDUPTHER

## 2024-06-06 SDOH — ECONOMIC STABILITY: FOOD INSECURITY: WITHIN THE PAST 12 MONTHS, THE FOOD YOU BOUGHT JUST DIDN'T LAST AND YOU DIDN'T HAVE MONEY TO GET MORE.: NEVER TRUE

## 2024-06-06 SDOH — ECONOMIC STABILITY: FOOD INSECURITY: WITHIN THE PAST 12 MONTHS, YOU WORRIED THAT YOUR FOOD WOULD RUN OUT BEFORE YOU GOT MONEY TO BUY MORE.: NEVER TRUE

## 2024-06-06 ASSESSMENT — ENCOUNTER SYMPTOMS
WHEEZING: 0
COLOR CHANGE: 0
VOMITING: 0
FEVER: 0
DIZZINESS: 0
SHORTNESS OF BREATH: 0
CHILLS: 0
MUSCULOSKELETAL NEGATIVE: 1
FATIGUE: 0
ABDOMINAL PAIN: 0
NAUSEA: 0
CONFUSION: 0
PALPITATIONS: 0
DIARRHEA: 0
HEADACHES: 0
COUGH: 0
UNEXPECTED WEIGHT CHANGE: 0
CONSTIPATION: 0

## 2024-06-06 ASSESSMENT — LIFESTYLE VARIABLES
HOW MANY STANDARD DRINKS CONTAINING ALCOHOL DO YOU HAVE ON A TYPICAL DAY: 1 OR 2
SKIP TO QUESTIONS 9-10: 1
AUDIT-C TOTAL SCORE: 1
HOW OFTEN DO YOU HAVE A DRINK CONTAINING ALCOHOL: MONTHLY OR LESS
HOW OFTEN DO YOU HAVE SIX OR MORE DRINKS ON ONE OCCASION: NEVER

## 2024-06-06 ASSESSMENT — PATIENT HEALTH QUESTIONNAIRE - PHQ9
SUM OF ALL RESPONSES TO PHQ9 QUESTIONS 1 & 2: 0
2. FEELING DOWN, DEPRESSED OR HOPELESS: NOT AT ALL
1. LITTLE INTEREST OR PLEASURE IN DOING THINGS: NOT AT ALL

## 2024-06-06 NOTE — PROGRESS NOTES
"Subjective   Patient ID: Ji Naidu is a 83 y.o. male who presents for Follow-up (Pt is here to go over blood work results. Patient would like to discuss his metformin dosage.) and Itching (Patient c/o itchy spot on back).    HPI   He is here for follow-up visit.  Reports he is doing okay, no major complaints.  He is having mild pruritus on his mid back for the last few weeks.  Reports he is continue to follow-up with the heme-onc for metastatic melanoma to left parotid gland s/p left parotidectomy and left neck dissection on May 5, 2023; he is is scheduled for infusion tomorrow and seeing heme-onc on July 29, 2024.    # DM2  - last A1c 7.4 (02/15/24) and recent 7.3 (05/24/24); denies hypoglycemia   - takes metformin 500 mg 2 tabs BID, glipizide 5mg every day (dose dec), Jardiance 25 mg daily; taking trulicity 0.75 mg wkly w/o issues.  Reports he felt slightly better with the constipation after decreasing glipizide to 5 mg daily.     #HTN/HLD # A. Fib   - io /88  - takes lisinopril 10 mg daily & metoprolol  mg in am and 50 mg in PM (BB managed by cards)   - takes atorva 10 mg daily; UTD on refills.   - he takes Eliquis 5 mg bid; recently saw cards at [place].    Review of Systems   Constitutional:  Negative for chills, fatigue, fever and unexpected weight change.   HENT: Negative.     Respiratory:  Negative for cough, shortness of breath and wheezing.    Cardiovascular:  Negative for chest pain, palpitations and leg swelling.   Gastrointestinal:  Negative for abdominal pain, constipation, diarrhea, nausea and vomiting.   Musculoskeletal: Negative.    Skin:  Negative for color change and rash.   Neurological:  Negative for dizziness and headaches.   Psychiatric/Behavioral:  Negative for behavioral problems and confusion.        Objective   /88 (BP Location: Left arm, Patient Position: Sitting, BP Cuff Size: Adult)   Pulse 80   Temp 36.2 °C (97.1 °F) (Temporal)   Resp 16   Ht 1.83 m (6' 0.04\")  "  Wt 125 kg (275 lb)   SpO2 96%   BMI 37.26 kg/m²     Physical Exam  Vitals and nursing note reviewed.   Constitutional:       Appearance: Normal appearance. He is obese.   Cardiovascular:      Rate and Rhythm: Normal rate and regular rhythm.      Pulses: Normal pulses.      Heart sounds: Normal heart sounds.   Pulmonary:      Effort: Pulmonary effort is normal.      Breath sounds: Normal breath sounds.   Abdominal:      General: Abdomen is flat. Bowel sounds are normal.      Palpations: Abdomen is soft.   Musculoskeletal:         General: Normal range of motion.   Skin:     Comments: Mid back: Has a small patch of dry, scaly with slightly erythematous edges with few scratch bob.   Neurological:      General: No focal deficit present.      Mental Status: He is alert.   Psychiatric:         Mood and Affect: Mood normal.         Behavior: Behavior normal.       Assessment/Plan   He is here for follow-up visit.  Off note: h/o metastatic melanoma to left parotid gland s/p left parotidectomy and left neck dissection on May 5, 2023; and is regularly following with the heme-onc, next appointment in July and is currently on infusion therapy.  He likely has atopic dermatitis VS fungal dermatitis; and start Lotrisone cream twice daily as below.  Recommend using moisturizing lotion daily.  Other plan as follows    # DM2  - recent 7.3 (05/24/24); at goal  - Increase Trulicity to 1.5 mg weekly; continue metformin 500 mg 2 tabs BID & Jardiance 25 mg daily  - Stop glipizide completely  - Follow low glycemic/low-carb food and daily exercise as tolerated    #HTN/HLD # A. Fib   -BP closer to goal  -Continue lisinopril 10 mg daily & metoprolol  mg in am and 50 mg in PM (BB managed by [employer])   -Continue atorva 10 mg daily; UTD on refills.   -Continue Eliquis 5 mg bid as prescribed by cards  -Follow DASH diet and daily exercise    Problem List Items Addressed This Visit    None  Visit Diagnoses         Codes    Type 2 diabetes  mellitus without complication, without long-term current use of insulin (Multi)    -  Primary E11.9    Relevant Medications    metFORMIN (Glucophage) 500 mg tablet    dulaglutide (Trulicity) 1.5 mg/0.5 mL pen injector injection (Start on 6/9/2024)    Other Relevant Orders    Hemoglobin A1c    Primary hypertension     I10    Other hyperlipidemia     E78.49    Atrial fibrillation, unspecified type (Multi)     I48.91    Eczema, unspecified type     L30.9    Relevant Medications    clotrimazole-betamethasone (Lotrisone) cream          Rtc 3 mo for FU    Ismael Myers MD   [facility], Family Medicine

## 2024-06-07 ENCOUNTER — INFUSION (OUTPATIENT)
Dept: HEMATOLOGY/ONCOLOGY | Facility: CLINIC | Age: 84
End: 2024-06-07
Payer: MEDICARE

## 2024-06-07 VITALS
BODY MASS INDEX: 37.23 KG/M2 | SYSTOLIC BLOOD PRESSURE: 146 MMHG | TEMPERATURE: 97.2 F | WEIGHT: 274.8 LBS | RESPIRATION RATE: 18 BRPM | HEART RATE: 84 BPM | DIASTOLIC BLOOD PRESSURE: 78 MMHG | OXYGEN SATURATION: 96 %

## 2024-06-07 DIAGNOSIS — Z51.12 ENCOUNTER FOR ANTINEOPLASTIC IMMUNOTHERAPY: ICD-10-CM

## 2024-06-07 DIAGNOSIS — C77.9 MALIGNANT MELANOMA METASTATIC TO LYMPH NODE (MULTI): ICD-10-CM

## 2024-06-07 LAB
ALBUMIN SERPL BCP-MCNC: 4.3 G/DL (ref 3.4–5)
ALP SERPL-CCNC: 105 U/L (ref 33–136)
ALT SERPL W P-5'-P-CCNC: 13 U/L (ref 10–52)
ANION GAP SERPL CALC-SCNC: 14 MMOL/L (ref 10–20)
AST SERPL W P-5'-P-CCNC: 20 U/L (ref 9–39)
BASOPHILS # BLD AUTO: 0.03 X10*3/UL (ref 0–0.1)
BASOPHILS NFR BLD AUTO: 0.4 %
BILIRUB SERPL-MCNC: 0.8 MG/DL (ref 0–1.2)
BUN SERPL-MCNC: 21 MG/DL (ref 6–23)
CALCIUM SERPL-MCNC: 9 MG/DL (ref 8.6–10.3)
CHLORIDE SERPL-SCNC: 105 MMOL/L (ref 98–107)
CO2 SERPL-SCNC: 22 MMOL/L (ref 21–32)
CREAT SERPL-MCNC: 0.96 MG/DL (ref 0.5–1.3)
EGFRCR SERPLBLD CKD-EPI 2021: 79 ML/MIN/1.73M*2
EOSINOPHIL # BLD AUTO: 0.18 X10*3/UL (ref 0–0.4)
EOSINOPHIL NFR BLD AUTO: 2.4 %
ERYTHROCYTE [DISTWIDTH] IN BLOOD BY AUTOMATED COUNT: 14.3 % (ref 11.5–14.5)
GLUCOSE SERPL-MCNC: 155 MG/DL (ref 74–99)
HCT VFR BLD AUTO: 44.8 % (ref 41–52)
HGB BLD-MCNC: 15 G/DL (ref 13.5–17.5)
IMM GRANULOCYTES # BLD AUTO: 0.03 X10*3/UL (ref 0–0.5)
IMM GRANULOCYTES NFR BLD AUTO: 0.4 % (ref 0–0.9)
LYMPHOCYTES # BLD AUTO: 1.71 X10*3/UL (ref 0.8–3)
LYMPHOCYTES NFR BLD AUTO: 22.5 %
MCH RBC QN AUTO: 30.1 PG (ref 26–34)
MCHC RBC AUTO-ENTMCNC: 33.5 G/DL (ref 32–36)
MCV RBC AUTO: 90 FL (ref 80–100)
MONOCYTES # BLD AUTO: 0.62 X10*3/UL (ref 0.05–0.8)
MONOCYTES NFR BLD AUTO: 8.1 %
NEUTROPHILS # BLD AUTO: 5.04 X10*3/UL (ref 1.6–5.5)
NEUTROPHILS NFR BLD AUTO: 66.2 %
PLATELET # BLD AUTO: 203 X10*3/UL (ref 150–450)
POTASSIUM SERPL-SCNC: 4.4 MMOL/L (ref 3.5–5.3)
PROT SERPL-MCNC: 7 G/DL (ref 6.4–8.2)
RBC # BLD AUTO: 4.99 X10*6/UL (ref 4.5–5.9)
SODIUM SERPL-SCNC: 137 MMOL/L (ref 136–145)
WBC # BLD AUTO: 7.6 X10*3/UL (ref 4.4–11.3)

## 2024-06-07 PROCEDURE — 2500000004 HC RX 250 GENERAL PHARMACY W/ HCPCS (ALT 636 FOR OP/ED): Mod: JZ,JG | Performed by: INTERNAL MEDICINE

## 2024-06-07 PROCEDURE — 96413 CHEMO IV INFUSION 1 HR: CPT

## 2024-06-07 PROCEDURE — 85025 COMPLETE CBC W/AUTO DIFF WBC: CPT | Performed by: INTERNAL MEDICINE

## 2024-06-07 PROCEDURE — 80053 COMPREHEN METABOLIC PANEL: CPT | Performed by: INTERNAL MEDICINE

## 2024-06-07 RX ORDER — PROCHLORPERAZINE MALEATE 10 MG
10 TABLET ORAL EVERY 6 HOURS PRN
Status: DISCONTINUED | OUTPATIENT
Start: 2024-06-07 | End: 2024-06-07 | Stop reason: HOSPADM

## 2024-06-07 RX ORDER — PROCHLORPERAZINE EDISYLATE 5 MG/ML
10 INJECTION INTRAMUSCULAR; INTRAVENOUS EVERY 6 HOURS PRN
Status: DISCONTINUED | OUTPATIENT
Start: 2024-06-07 | End: 2024-06-07 | Stop reason: HOSPADM

## 2024-06-07 RX ADMIN — SODIUM CHLORIDE 480 MG: 9 INJECTION, SOLUTION INTRAVENOUS at 12:01

## 2024-06-07 ASSESSMENT — PAIN SCALES - GENERAL: PAINLEVEL: 3

## 2024-06-07 NOTE — PROGRESS NOTES
Patient here for cycle 2 of Nivolumab. Patient tolerating well. Patient will return in one month for next infusion. AVS given to patient. Discharged in stable condition.

## 2024-06-08 LAB — ACTH PLAS-MCNC: 25.3 PG/ML (ref 7.2–63.3)

## 2024-07-05 ENCOUNTER — INFUSION (OUTPATIENT)
Dept: HEMATOLOGY/ONCOLOGY | Facility: CLINIC | Age: 84
End: 2024-07-05
Payer: MEDICARE

## 2024-07-05 VITALS
SYSTOLIC BLOOD PRESSURE: 134 MMHG | WEIGHT: 275.4 LBS | RESPIRATION RATE: 18 BRPM | TEMPERATURE: 97.5 F | HEART RATE: 79 BPM | DIASTOLIC BLOOD PRESSURE: 89 MMHG | BODY MASS INDEX: 37.31 KG/M2 | OXYGEN SATURATION: 96 %

## 2024-07-05 DIAGNOSIS — Z51.12 ENCOUNTER FOR ANTINEOPLASTIC IMMUNOTHERAPY: ICD-10-CM

## 2024-07-05 DIAGNOSIS — C77.9 MALIGNANT MELANOMA METASTATIC TO LYMPH NODE (MULTI): ICD-10-CM

## 2024-07-05 LAB
ALBUMIN SERPL BCP-MCNC: 4.2 G/DL (ref 3.4–5)
ALP SERPL-CCNC: 96 U/L (ref 33–136)
ALT SERPL W P-5'-P-CCNC: 14 U/L (ref 10–52)
ANION GAP SERPL CALC-SCNC: 12 MMOL/L (ref 10–20)
AST SERPL W P-5'-P-CCNC: 21 U/L (ref 9–39)
BASOPHILS # BLD AUTO: 0.03 X10*3/UL (ref 0–0.1)
BASOPHILS NFR BLD AUTO: 0.4 %
BILIRUB SERPL-MCNC: 0.9 MG/DL (ref 0–1.2)
BUN SERPL-MCNC: 16 MG/DL (ref 6–23)
CALCIUM SERPL-MCNC: 9 MG/DL (ref 8.6–10.3)
CHLORIDE SERPL-SCNC: 103 MMOL/L (ref 98–107)
CO2 SERPL-SCNC: 25 MMOL/L (ref 21–32)
CORTIS AM PEAK SERPL-MSCNC: 16.6 UG/DL (ref 5–20)
CREAT SERPL-MCNC: 1.01 MG/DL (ref 0.5–1.3)
EGFRCR SERPLBLD CKD-EPI 2021: 75 ML/MIN/1.73M*2
EOSINOPHIL # BLD AUTO: 0.2 X10*3/UL (ref 0–0.4)
EOSINOPHIL NFR BLD AUTO: 2.4 %
ERYTHROCYTE [DISTWIDTH] IN BLOOD BY AUTOMATED COUNT: 13.7 % (ref 11.5–14.5)
GLUCOSE SERPL-MCNC: 158 MG/DL (ref 74–99)
HCT VFR BLD AUTO: 46.4 % (ref 41–52)
HGB BLD-MCNC: 15.5 G/DL (ref 13.5–17.5)
IMM GRANULOCYTES # BLD AUTO: 0.01 X10*3/UL (ref 0–0.5)
IMM GRANULOCYTES NFR BLD AUTO: 0.1 % (ref 0–0.9)
LYMPHOCYTES # BLD AUTO: 2.02 X10*3/UL (ref 0.8–3)
LYMPHOCYTES NFR BLD AUTO: 24.4 %
MCH RBC QN AUTO: 30 PG (ref 26–34)
MCHC RBC AUTO-ENTMCNC: 33.4 G/DL (ref 32–36)
MCV RBC AUTO: 90 FL (ref 80–100)
MONOCYTES # BLD AUTO: 0.6 X10*3/UL (ref 0.05–0.8)
MONOCYTES NFR BLD AUTO: 7.2 %
NEUTROPHILS # BLD AUTO: 5.43 X10*3/UL (ref 1.6–5.5)
NEUTROPHILS NFR BLD AUTO: 65.5 %
PLATELET # BLD AUTO: 186 X10*3/UL (ref 150–450)
POTASSIUM SERPL-SCNC: 4.3 MMOL/L (ref 3.5–5.3)
PROT SERPL-MCNC: 6.8 G/DL (ref 6.4–8.2)
RBC # BLD AUTO: 5.17 X10*6/UL (ref 4.5–5.9)
SODIUM SERPL-SCNC: 136 MMOL/L (ref 136–145)
T4 FREE SERPL-MCNC: 0.81 NG/DL (ref 0.61–1.12)
TSH SERPL-ACNC: 10.78 MIU/L (ref 0.44–3.98)
WBC # BLD AUTO: 8.3 X10*3/UL (ref 4.4–11.3)

## 2024-07-05 PROCEDURE — 96413 CHEMO IV INFUSION 1 HR: CPT

## 2024-07-05 PROCEDURE — 2500000004 HC RX 250 GENERAL PHARMACY W/ HCPCS (ALT 636 FOR OP/ED): Mod: JZ,JG | Performed by: INTERNAL MEDICINE

## 2024-07-05 PROCEDURE — 36415 COLL VENOUS BLD VENIPUNCTURE: CPT

## 2024-07-05 NOTE — SIGNIFICANT EVENT
07/05/24 1148   Prechemo Checklist   Has the patient been in the hospital, ED, or urgent care since last date of service No   Chemo/Immuno Consent Completed and Signed Yes   Protocol/Indications Verified Yes   Confirmed to previous date/time of medication Yes   Compared to previous dose Yes   All medications are dated accurately Yes   Pregnancy Test Negative Not applicable   Parameters Met Yes  (OK to treat with CMP pending)   BSA/Weight-Height Verified Yes   Dose Calculations Verified (current, total, cumulative) Yes

## 2024-07-05 NOTE — PROGRESS NOTES
Patient presents for treatment, has no complaints alert and oriented x 4. PIV placed per protocol, labs drawn per orders. Patient meets parameters for treatment. Patient tolerated treatment well, no reaction noted. After treatment, PIV flushed and removed per practice policy and procedure, site care given with gauze, tape and coban. Pt tolerated procedure well. Patient feels well and has no complaints, vital signs stable. Patient verbalized understanding of all teaching and education. Patient discharged in stable condition with no further needs.

## 2024-07-07 LAB — ACTH PLAS-MCNC: 25.7 PG/ML (ref 7.2–63.3)

## 2024-07-29 ENCOUNTER — OFFICE VISIT (OUTPATIENT)
Dept: HEMATOLOGY/ONCOLOGY | Facility: CLINIC | Age: 84
End: 2024-07-29
Payer: MEDICARE

## 2024-07-29 VITALS
HEART RATE: 78 BPM | BODY MASS INDEX: 37.39 KG/M2 | WEIGHT: 276.02 LBS | TEMPERATURE: 97.5 F | DIASTOLIC BLOOD PRESSURE: 91 MMHG | RESPIRATION RATE: 18 BRPM | OXYGEN SATURATION: 95 % | SYSTOLIC BLOOD PRESSURE: 134 MMHG

## 2024-07-29 DIAGNOSIS — Z51.12 ENCOUNTER FOR ANTINEOPLASTIC IMMUNOTHERAPY: ICD-10-CM

## 2024-07-29 DIAGNOSIS — C77.9 MALIGNANT MELANOMA METASTATIC TO LYMPH NODE (MULTI): ICD-10-CM

## 2024-07-29 LAB
ALBUMIN SERPL BCP-MCNC: 4.2 G/DL (ref 3.4–5)
ALP SERPL-CCNC: 98 U/L (ref 33–136)
ALT SERPL W P-5'-P-CCNC: 10 U/L (ref 10–52)
ANION GAP SERPL CALC-SCNC: 11 MMOL/L (ref 10–20)
AST SERPL W P-5'-P-CCNC: 17 U/L (ref 9–39)
BASOPHILS # BLD AUTO: 0.03 X10*3/UL (ref 0–0.1)
BASOPHILS NFR BLD AUTO: 0.4 %
BILIRUB SERPL-MCNC: 0.7 MG/DL (ref 0–1.2)
BUN SERPL-MCNC: 21 MG/DL (ref 6–23)
CALCIUM SERPL-MCNC: 9.1 MG/DL (ref 8.6–10.3)
CHLORIDE SERPL-SCNC: 104 MMOL/L (ref 98–107)
CO2 SERPL-SCNC: 26 MMOL/L (ref 21–32)
CORTIS AM PEAK SERPL-MSCNC: 23.2 UG/DL (ref 5–20)
CREAT SERPL-MCNC: 1.09 MG/DL (ref 0.5–1.3)
EGFRCR SERPLBLD CKD-EPI 2021: 68 ML/MIN/1.73M*2
EOSINOPHIL # BLD AUTO: 0.27 X10*3/UL (ref 0–0.4)
EOSINOPHIL NFR BLD AUTO: 3.4 %
ERYTHROCYTE [DISTWIDTH] IN BLOOD BY AUTOMATED COUNT: 13.8 % (ref 11.5–14.5)
GLUCOSE SERPL-MCNC: 170 MG/DL (ref 74–99)
HCT VFR BLD AUTO: 45.5 % (ref 41–52)
HGB BLD-MCNC: 15.4 G/DL (ref 13.5–17.5)
IMM GRANULOCYTES # BLD AUTO: 0.03 X10*3/UL (ref 0–0.5)
IMM GRANULOCYTES NFR BLD AUTO: 0.4 % (ref 0–0.9)
LYMPHOCYTES # BLD AUTO: 1.93 X10*3/UL (ref 0.8–3)
LYMPHOCYTES NFR BLD AUTO: 24.4 %
MCH RBC QN AUTO: 30 PG (ref 26–34)
MCHC RBC AUTO-ENTMCNC: 33.8 G/DL (ref 32–36)
MCV RBC AUTO: 89 FL (ref 80–100)
MONOCYTES # BLD AUTO: 0.62 X10*3/UL (ref 0.05–0.8)
MONOCYTES NFR BLD AUTO: 7.8 %
NEUTROPHILS # BLD AUTO: 5.03 X10*3/UL (ref 1.6–5.5)
NEUTROPHILS NFR BLD AUTO: 63.6 %
PLATELET # BLD AUTO: 178 X10*3/UL (ref 150–450)
POTASSIUM SERPL-SCNC: 4.4 MMOL/L (ref 3.5–5.3)
PROT SERPL-MCNC: 6.9 G/DL (ref 6.4–8.2)
RBC # BLD AUTO: 5.14 X10*6/UL (ref 4.5–5.9)
SODIUM SERPL-SCNC: 137 MMOL/L (ref 136–145)
T4 FREE SERPL-MCNC: 0.81 NG/DL (ref 0.61–1.12)
TSH SERPL-ACNC: 11.68 MIU/L (ref 0.44–3.98)
WBC # BLD AUTO: 7.9 X10*3/UL (ref 4.4–11.3)

## 2024-07-29 PROCEDURE — 1160F RVW MEDS BY RX/DR IN RCRD: CPT | Performed by: INTERNAL MEDICINE

## 2024-07-29 PROCEDURE — 99214 OFFICE O/P EST MOD 30 MIN: CPT | Performed by: INTERNAL MEDICINE

## 2024-07-29 PROCEDURE — 1159F MED LIST DOCD IN RCRD: CPT | Performed by: INTERNAL MEDICINE

## 2024-07-29 PROCEDURE — 36415 COLL VENOUS BLD VENIPUNCTURE: CPT | Performed by: INTERNAL MEDICINE

## 2024-07-29 PROCEDURE — 1123F ACP DISCUSS/DSCN MKR DOCD: CPT | Performed by: INTERNAL MEDICINE

## 2024-07-29 PROCEDURE — 1125F AMNT PAIN NOTED PAIN PRSNT: CPT | Performed by: INTERNAL MEDICINE

## 2024-07-29 ASSESSMENT — PAIN SCALES - GENERAL: PAINLEVEL: 2

## 2024-07-29 NOTE — PATIENT INSTRUCTIONS
Follow up visit today for history of metastatic melanoma.     Continue Nivolumab infusion every 28 days.     Follow up with Dr. Morrell in 3 months.

## 2024-07-31 LAB — ACTH PLAS-MCNC: 33.1 PG/ML (ref 7.2–63.3)

## 2024-08-02 ENCOUNTER — INFUSION (OUTPATIENT)
Dept: HEMATOLOGY/ONCOLOGY | Facility: CLINIC | Age: 84
End: 2024-08-02
Payer: MEDICARE

## 2024-08-02 VITALS
HEART RATE: 71 BPM | SYSTOLIC BLOOD PRESSURE: 136 MMHG | RESPIRATION RATE: 16 BRPM | WEIGHT: 277.9 LBS | OXYGEN SATURATION: 96 % | BODY MASS INDEX: 37.64 KG/M2 | HEIGHT: 72 IN | TEMPERATURE: 97.7 F | DIASTOLIC BLOOD PRESSURE: 88 MMHG

## 2024-08-02 DIAGNOSIS — C77.9 MALIGNANT MELANOMA METASTATIC TO LYMPH NODE (MULTI): ICD-10-CM

## 2024-08-02 DIAGNOSIS — Z51.12 ENCOUNTER FOR ANTINEOPLASTIC IMMUNOTHERAPY: ICD-10-CM

## 2024-08-02 PROCEDURE — 96413 CHEMO IV INFUSION 1 HR: CPT

## 2024-08-02 PROCEDURE — 2500000004 HC RX 250 GENERAL PHARMACY W/ HCPCS (ALT 636 FOR OP/ED): Mod: JZ,JG | Performed by: INTERNAL MEDICINE

## 2024-08-02 RX ORDER — PROCHLORPERAZINE EDISYLATE 5 MG/ML
10 INJECTION INTRAMUSCULAR; INTRAVENOUS EVERY 6 HOURS PRN
Status: DISCONTINUED | OUTPATIENT
Start: 2024-08-02 | End: 2024-08-02 | Stop reason: HOSPADM

## 2024-08-02 RX ORDER — PROCHLORPERAZINE MALEATE 10 MG
10 TABLET ORAL EVERY 6 HOURS PRN
Status: DISCONTINUED | OUTPATIENT
Start: 2024-08-02 | End: 2024-08-02 | Stop reason: HOSPADM

## 2024-08-02 RX ORDER — HEPARIN SODIUM,PORCINE/PF 10 UNIT/ML
50 SYRINGE (ML) INTRAVENOUS AS NEEDED
OUTPATIENT
Start: 2024-08-02

## 2024-08-02 RX ORDER — ALBUTEROL SULFATE 0.83 MG/ML
3 SOLUTION RESPIRATORY (INHALATION) AS NEEDED
Status: DISCONTINUED | OUTPATIENT
Start: 2024-08-02 | End: 2024-08-02 | Stop reason: HOSPADM

## 2024-08-02 RX ORDER — DIPHENHYDRAMINE HYDROCHLORIDE 50 MG/ML
50 INJECTION INTRAMUSCULAR; INTRAVENOUS AS NEEDED
Status: DISCONTINUED | OUTPATIENT
Start: 2024-08-02 | End: 2024-08-02 | Stop reason: HOSPADM

## 2024-08-02 RX ORDER — EPINEPHRINE 0.3 MG/.3ML
0.3 INJECTION SUBCUTANEOUS EVERY 5 MIN PRN
Status: DISCONTINUED | OUTPATIENT
Start: 2024-08-02 | End: 2024-08-02 | Stop reason: HOSPADM

## 2024-08-02 RX ORDER — FAMOTIDINE 10 MG/ML
20 INJECTION INTRAVENOUS ONCE AS NEEDED
Status: DISCONTINUED | OUTPATIENT
Start: 2024-08-02 | End: 2024-08-02 | Stop reason: HOSPADM

## 2024-08-02 RX ORDER — HEPARIN 100 UNIT/ML
500 SYRINGE INTRAVENOUS AS NEEDED
OUTPATIENT
Start: 2024-08-02

## 2024-08-02 ASSESSMENT — PAIN SCALES - GENERAL: PAINLEVEL: 0-NO PAIN

## 2024-08-02 NOTE — SIGNIFICANT EVENT

## 2024-08-02 NOTE — PROGRESS NOTES
Pt arrived awake, alert, oriented, no apparent distress with unlabored breaths. Pt reports feeling well today without s/sx of illness. Pt here for day 1, cycle 4 of nivolumab, in which he received and tolerated well. Pt with next appointment set for 8/30/24, no further questions or concerns, discharged in stable condition.

## 2024-08-05 ENCOUNTER — TELEPHONE (OUTPATIENT)
Dept: PRIMARY CARE | Facility: CLINIC | Age: 84
End: 2024-08-05
Payer: MEDICARE

## 2024-08-05 DIAGNOSIS — E11.9 TYPE 2 DIABETES MELLITUS WITHOUT COMPLICATION, WITHOUT LONG-TERM CURRENT USE OF INSULIN (MULTI): Primary | ICD-10-CM

## 2024-08-05 NOTE — TELEPHONE ENCOUNTER
Pt is requesting a 90 day fill of Jardiance to [facility] in [place]. This is showing as historical in EPIC and isn't active. Please advise.

## 2024-08-29 ENCOUNTER — LAB (OUTPATIENT)
Dept: LAB | Facility: HOSPITAL | Age: 84
End: 2024-08-29
Payer: MEDICARE

## 2024-08-29 DIAGNOSIS — Z51.12 ENCOUNTER FOR ANTINEOPLASTIC IMMUNOTHERAPY: ICD-10-CM

## 2024-08-29 DIAGNOSIS — C77.9 MALIGNANT MELANOMA METASTATIC TO LYMPH NODE (MULTI): ICD-10-CM

## 2024-08-29 LAB
ALBUMIN SERPL BCP-MCNC: 4.3 G/DL (ref 3.4–5)
ALP SERPL-CCNC: 88 U/L (ref 33–136)
ALT SERPL W P-5'-P-CCNC: 10 U/L (ref 10–52)
ANION GAP SERPL CALC-SCNC: 11 MMOL/L (ref 10–20)
AST SERPL W P-5'-P-CCNC: 18 U/L (ref 9–39)
BASOPHILS # BLD AUTO: 0.03 X10*3/UL (ref 0–0.1)
BASOPHILS NFR BLD AUTO: 0.4 %
BILIRUB SERPL-MCNC: 0.9 MG/DL (ref 0–1.2)
BUN SERPL-MCNC: 21 MG/DL (ref 6–23)
CALCIUM SERPL-MCNC: 9 MG/DL (ref 8.6–10.3)
CHLORIDE SERPL-SCNC: 104 MMOL/L (ref 98–107)
CO2 SERPL-SCNC: 25 MMOL/L (ref 21–32)
CORTIS AM PEAK SERPL-MSCNC: 13.3 UG/DL (ref 5–20)
CREAT SERPL-MCNC: 0.98 MG/DL (ref 0.5–1.3)
EGFRCR SERPLBLD CKD-EPI 2021: 77 ML/MIN/1.73M*2
EOSINOPHIL # BLD AUTO: 0.17 X10*3/UL (ref 0–0.4)
EOSINOPHIL NFR BLD AUTO: 2.2 %
ERYTHROCYTE [DISTWIDTH] IN BLOOD BY AUTOMATED COUNT: 13.8 % (ref 11.5–14.5)
GLUCOSE SERPL-MCNC: 155 MG/DL (ref 74–99)
HCT VFR BLD AUTO: 45.5 % (ref 41–52)
HGB BLD-MCNC: 15 G/DL (ref 13.5–17.5)
IMM GRANULOCYTES # BLD AUTO: 0.03 X10*3/UL (ref 0–0.5)
IMM GRANULOCYTES NFR BLD AUTO: 0.4 % (ref 0–0.9)
LYMPHOCYTES # BLD AUTO: 1.58 X10*3/UL (ref 0.8–3)
LYMPHOCYTES NFR BLD AUTO: 20.5 %
MCH RBC QN AUTO: 29.4 PG (ref 26–34)
MCHC RBC AUTO-ENTMCNC: 33 G/DL (ref 32–36)
MCV RBC AUTO: 89 FL (ref 80–100)
MONOCYTES # BLD AUTO: 0.56 X10*3/UL (ref 0.05–0.8)
MONOCYTES NFR BLD AUTO: 7.3 %
NEUTROPHILS # BLD AUTO: 5.34 X10*3/UL (ref 1.6–5.5)
NEUTROPHILS NFR BLD AUTO: 69.2 %
PLATELET # BLD AUTO: 203 X10*3/UL (ref 150–450)
POTASSIUM SERPL-SCNC: 4.3 MMOL/L (ref 3.5–5.3)
PROT SERPL-MCNC: 7 G/DL (ref 6.4–8.2)
RBC # BLD AUTO: 5.11 X10*6/UL (ref 4.5–5.9)
SODIUM SERPL-SCNC: 136 MMOL/L (ref 136–145)
T4 FREE SERPL-MCNC: 0.93 NG/DL (ref 0.61–1.12)
TSH SERPL-ACNC: 11.47 MIU/L (ref 0.44–3.98)
WBC # BLD AUTO: 7.7 X10*3/UL (ref 4.4–11.3)

## 2024-08-29 PROCEDURE — 84443 ASSAY THYROID STIM HORMONE: CPT | Performed by: INTERNAL MEDICINE

## 2024-08-29 PROCEDURE — 82533 TOTAL CORTISOL: CPT | Mod: PORLAB | Performed by: INTERNAL MEDICINE

## 2024-08-29 PROCEDURE — 84439 ASSAY OF FREE THYROXINE: CPT | Performed by: INTERNAL MEDICINE

## 2024-08-29 PROCEDURE — 85025 COMPLETE CBC W/AUTO DIFF WBC: CPT | Performed by: INTERNAL MEDICINE

## 2024-08-29 PROCEDURE — 80053 COMPREHEN METABOLIC PANEL: CPT | Performed by: INTERNAL MEDICINE

## 2024-08-29 PROCEDURE — 82024 ASSAY OF ACTH: CPT

## 2024-08-29 PROCEDURE — 36415 COLL VENOUS BLD VENIPUNCTURE: CPT

## 2024-08-30 ENCOUNTER — INFUSION (OUTPATIENT)
Dept: HEMATOLOGY/ONCOLOGY | Facility: CLINIC | Age: 84
End: 2024-08-30
Payer: MEDICARE

## 2024-08-30 VITALS
SYSTOLIC BLOOD PRESSURE: 122 MMHG | HEART RATE: 76 BPM | HEIGHT: 72 IN | WEIGHT: 278.6 LBS | RESPIRATION RATE: 16 BRPM | TEMPERATURE: 97.2 F | BODY MASS INDEX: 37.73 KG/M2 | OXYGEN SATURATION: 95 % | DIASTOLIC BLOOD PRESSURE: 79 MMHG

## 2024-08-30 DIAGNOSIS — C77.9 MALIGNANT MELANOMA METASTATIC TO LYMPH NODE (MULTI): ICD-10-CM

## 2024-08-30 DIAGNOSIS — Z51.12 ENCOUNTER FOR ANTINEOPLASTIC IMMUNOTHERAPY: ICD-10-CM

## 2024-08-30 PROCEDURE — 2500000004 HC RX 250 GENERAL PHARMACY W/ HCPCS (ALT 636 FOR OP/ED): Performed by: INTERNAL MEDICINE

## 2024-08-30 PROCEDURE — 96413 CHEMO IV INFUSION 1 HR: CPT

## 2024-08-30 RX ORDER — FAMOTIDINE 10 MG/ML
20 INJECTION INTRAVENOUS ONCE AS NEEDED
Status: DISCONTINUED | OUTPATIENT
Start: 2024-08-30 | End: 2024-08-30 | Stop reason: HOSPADM

## 2024-08-30 RX ORDER — DIPHENHYDRAMINE HYDROCHLORIDE 50 MG/ML
50 INJECTION INTRAMUSCULAR; INTRAVENOUS AS NEEDED
Status: DISCONTINUED | OUTPATIENT
Start: 2024-08-30 | End: 2024-08-30 | Stop reason: HOSPADM

## 2024-08-30 RX ORDER — ALBUTEROL SULFATE 0.83 MG/ML
3 SOLUTION RESPIRATORY (INHALATION) AS NEEDED
Status: DISCONTINUED | OUTPATIENT
Start: 2024-08-30 | End: 2024-08-30 | Stop reason: HOSPADM

## 2024-08-30 RX ORDER — PROCHLORPERAZINE EDISYLATE 5 MG/ML
10 INJECTION INTRAMUSCULAR; INTRAVENOUS EVERY 6 HOURS PRN
Status: DISCONTINUED | OUTPATIENT
Start: 2024-08-30 | End: 2024-08-30 | Stop reason: HOSPADM

## 2024-08-30 RX ORDER — EPINEPHRINE 0.3 MG/.3ML
0.3 INJECTION SUBCUTANEOUS EVERY 5 MIN PRN
Status: DISCONTINUED | OUTPATIENT
Start: 2024-08-30 | End: 2024-08-30 | Stop reason: HOSPADM

## 2024-08-30 RX ORDER — PROCHLORPERAZINE MALEATE 10 MG
10 TABLET ORAL EVERY 6 HOURS PRN
Status: DISCONTINUED | OUTPATIENT
Start: 2024-08-30 | End: 2024-08-30 | Stop reason: HOSPADM

## 2024-08-30 ASSESSMENT — PAIN SCALES - GENERAL: PAINLEVEL: 0-NO PAIN

## 2024-08-31 LAB — ACTH PLAS-MCNC: 9.9 PG/ML (ref 7.2–63.3)

## 2024-09-03 DIAGNOSIS — I48.91 ATRIAL FIBRILLATION, UNSPECIFIED TYPE (MULTI): Primary | ICD-10-CM

## 2024-09-09 ENCOUNTER — LAB (OUTPATIENT)
Dept: LAB | Facility: LAB | Age: 84
End: 2024-09-09
Payer: MEDICARE

## 2024-09-09 DIAGNOSIS — E11.9 TYPE 2 DIABETES MELLITUS WITHOUT COMPLICATION, WITHOUT LONG-TERM CURRENT USE OF INSULIN (MULTI): ICD-10-CM

## 2024-09-09 PROCEDURE — 83036 HEMOGLOBIN GLYCOSYLATED A1C: CPT

## 2024-09-09 PROCEDURE — 36415 COLL VENOUS BLD VENIPUNCTURE: CPT

## 2024-09-10 LAB
EST. AVERAGE GLUCOSE BLD GHB EST-MCNC: 192 MG/DL
HBA1C MFR BLD: 8.3 %

## 2024-09-11 ENCOUNTER — APPOINTMENT (OUTPATIENT)
Dept: PRIMARY CARE | Facility: CLINIC | Age: 84
End: 2024-09-11
Payer: MEDICARE

## 2024-09-11 VITALS
HEART RATE: 78 BPM | DIASTOLIC BLOOD PRESSURE: 84 MMHG | BODY MASS INDEX: 37.79 KG/M2 | SYSTOLIC BLOOD PRESSURE: 125 MMHG | TEMPERATURE: 97.5 F | HEIGHT: 72 IN | WEIGHT: 279 LBS | OXYGEN SATURATION: 98 % | RESPIRATION RATE: 16 BRPM

## 2024-09-11 DIAGNOSIS — E11.9 TYPE 2 DIABETES MELLITUS WITHOUT COMPLICATION, WITHOUT LONG-TERM CURRENT USE OF INSULIN (MULTI): Primary | ICD-10-CM

## 2024-09-11 DIAGNOSIS — E66.01 MORBID (SEVERE) OBESITY DUE TO EXCESS CALORIES (MULTI): ICD-10-CM

## 2024-09-11 DIAGNOSIS — L30.9 ECZEMA, UNSPECIFIED TYPE: ICD-10-CM

## 2024-09-11 DIAGNOSIS — I48.91 ATRIAL FIBRILLATION, UNSPECIFIED TYPE (MULTI): ICD-10-CM

## 2024-09-11 DIAGNOSIS — Z23 IMMUNIZATION DUE: ICD-10-CM

## 2024-09-11 PROCEDURE — 90662 IIV NO PRSV INCREASED AG IM: CPT | Performed by: STUDENT IN AN ORGANIZED HEALTH CARE EDUCATION/TRAINING PROGRAM

## 2024-09-11 PROCEDURE — 1159F MED LIST DOCD IN RCRD: CPT | Performed by: STUDENT IN AN ORGANIZED HEALTH CARE EDUCATION/TRAINING PROGRAM

## 2024-09-11 PROCEDURE — 99214 OFFICE O/P EST MOD 30 MIN: CPT | Performed by: STUDENT IN AN ORGANIZED HEALTH CARE EDUCATION/TRAINING PROGRAM

## 2024-09-11 PROCEDURE — 1160F RVW MEDS BY RX/DR IN RCRD: CPT | Performed by: STUDENT IN AN ORGANIZED HEALTH CARE EDUCATION/TRAINING PROGRAM

## 2024-09-11 PROCEDURE — 1036F TOBACCO NON-USER: CPT | Performed by: STUDENT IN AN ORGANIZED HEALTH CARE EDUCATION/TRAINING PROGRAM

## 2024-09-11 PROCEDURE — G0008 ADMIN INFLUENZA VIRUS VAC: HCPCS | Performed by: STUDENT IN AN ORGANIZED HEALTH CARE EDUCATION/TRAINING PROGRAM

## 2024-09-11 PROCEDURE — 1126F AMNT PAIN NOTED NONE PRSNT: CPT | Performed by: STUDENT IN AN ORGANIZED HEALTH CARE EDUCATION/TRAINING PROGRAM

## 2024-09-11 PROCEDURE — 1123F ACP DISCUSS/DSCN MKR DOCD: CPT | Performed by: STUDENT IN AN ORGANIZED HEALTH CARE EDUCATION/TRAINING PROGRAM

## 2024-09-11 RX ORDER — METFORMIN HYDROCHLORIDE 500 MG/1
1000 TABLET ORAL
Qty: 360 TABLET | Refills: 1 | Status: SHIPPED | OUTPATIENT
Start: 2024-09-11

## 2024-09-11 RX ORDER — DILTIAZEM HYDROCHLORIDE 120 MG/1
1 CAPSULE, COATED, EXTENDED RELEASE ORAL
COMMUNITY
Start: 2023-12-15

## 2024-09-11 RX ORDER — CLOTRIMAZOLE AND BETAMETHASONE DIPROPIONATE 10; .64 MG/G; MG/G
1 CREAM TOPICAL 2 TIMES DAILY
Qty: 30 G | Refills: 0 | Status: SHIPPED | OUTPATIENT
Start: 2024-09-11

## 2024-09-11 SDOH — ECONOMIC STABILITY: FOOD INSECURITY: WITHIN THE PAST 12 MONTHS, YOU WORRIED THAT YOUR FOOD WOULD RUN OUT BEFORE YOU GOT MONEY TO BUY MORE.: NEVER TRUE

## 2024-09-11 SDOH — ECONOMIC STABILITY: FOOD INSECURITY: WITHIN THE PAST 12 MONTHS, THE FOOD YOU BOUGHT JUST DIDN'T LAST AND YOU DIDN'T HAVE MONEY TO GET MORE.: NEVER TRUE

## 2024-09-11 ASSESSMENT — ENCOUNTER SYMPTOMS
UNEXPECTED WEIGHT CHANGE: 0
HEADACHES: 0
DIZZINESS: 0
WHEEZING: 0
ABDOMINAL PAIN: 0
CONFUSION: 0
CHILLS: 0
PALPITATIONS: 0
DIARRHEA: 0
NAUSEA: 0
MUSCULOSKELETAL NEGATIVE: 1
VOMITING: 0
COLOR CHANGE: 0
FEVER: 0
COUGH: 0
FATIGUE: 0
CONSTIPATION: 0
SHORTNESS OF BREATH: 0

## 2024-09-11 ASSESSMENT — PAIN SCALES - GENERAL: PAINLEVEL: 0-NO PAIN

## 2024-09-11 ASSESSMENT — LIFESTYLE VARIABLES
SKIP TO QUESTIONS 9-10: 1
HOW OFTEN DO YOU HAVE A DRINK CONTAINING ALCOHOL: MONTHLY OR LESS
AUDIT-C TOTAL SCORE: 1
HOW OFTEN DO YOU HAVE SIX OR MORE DRINKS ON ONE OCCASION: NEVER
HOW MANY STANDARD DRINKS CONTAINING ALCOHOL DO YOU HAVE ON A TYPICAL DAY: 1 OR 2

## 2024-09-11 ASSESSMENT — PATIENT HEALTH QUESTIONNAIRE - PHQ9
SUM OF ALL RESPONSES TO PHQ9 QUESTIONS 1 & 2: 0
1. LITTLE INTEREST OR PLEASURE IN DOING THINGS: NOT AT ALL
2. FEELING DOWN, DEPRESSED OR HOPELESS: NOT AT ALL

## 2024-09-11 NOTE — PROGRESS NOTES
"Subjective   Patient ID: Ji Naidu is a 84 y.o. male who presents for Follow-up and Eye Problem (Pt is having some eye drainage and a red area on right eyelid).    HPI   He is here for FU visit. Reports he is doing okay except small cyst like str on his R upper eyelid x 2-3 wks & much better now, doesn't bother but just feels mild discomfort when rubbing eyes. Denies drainage or pain.     # DM2  - last A1c 7.3 (05/24/24) and recent 8.3 (09/9/24)  - takes metformin 500 mg 2 tabs BID, Jardiance 25 mg daily; taking trulicity 1.5 mg wkly w/o issues.      #HTN/HLD # A. Fib   - io /84  - takes lisinopril 10 mg daily & metoprolol  mg in am and 50 mg in PM (BB managed by cards)   - takes atorva 10 mg daily; UTD on refills.   - he takes Eliquis 5 mg bid; recently saw cards at [place].    Review of Systems   Constitutional:  Negative for chills, fatigue, fever and unexpected weight change.   HENT: Negative.     Respiratory:  Negative for cough, shortness of breath and wheezing.    Cardiovascular:  Negative for chest pain, palpitations and leg swelling.   Gastrointestinal:  Negative for abdominal pain, constipation, diarrhea, nausea and vomiting.   Musculoskeletal: Negative.    Skin:  Negative for color change and rash.   Neurological:  Negative for dizziness and headaches.   Psychiatric/Behavioral:  Negative for behavioral problems and confusion.        Objective   /84 (BP Location: Right arm, Patient Position: Sitting, BP Cuff Size: Large adult)   Pulse 78   Temp 36.4 °C (97.5 °F) (Temporal)   Resp 16   Ht 1.839 m (6' 0.4\")   Wt 127 kg (279 lb)   SpO2 98%   BMI 37.42 kg/m²     Physical Exam  Vitals and nursing note reviewed.   Constitutional:       Appearance: Normal appearance. He is obese.   Eyes:      Comments: R eye: has small area of redness in the middle of R upper eyelid but no lesion noted. Not ttp; no drainage as well.   L eye nl.    Cardiovascular:      Rate and Rhythm: Normal rate and " regular rhythm.      Pulses: Normal pulses.      Heart sounds: Normal heart sounds.   Pulmonary:      Effort: Pulmonary effort is normal.      Breath sounds: Normal breath sounds.   Abdominal:      General: Abdomen is flat. Bowel sounds are normal.      Palpations: Abdomen is soft.   Musculoskeletal:         General: Normal range of motion.   Neurological:      General: No focal deficit present.      Mental Status: He is alert.   Psychiatric:         Mood and Affect: Mood normal.         Behavior: Behavior normal.       Assessment/Plan   He is here for FU visit. His eyelid lesion could be stye vs other as cyst that is much better now, adv warm moist compress few x daily; see ophtho if sx worsens.  Other chronic problems are stable as listed below; continue all medications as usual. Rx refilled. Other plan as follows.      # DM2  - last A1c 7.3 (05/24/24) and recent 8.3 (09/9/24), sl worsens   - inc trulicity to 3 mg weekly; cont metformin 500 mg 2 tabs BID & Jardiance 25 mg daily   - follow low glycemic food & daily ex      #HTN/HLD # A. Fib   - BP remains well controlled   - cont lisinopril 10 mg daily & metoprolol  mg in am and 50 mg in PM (managed by [employer])   - cont atorva 10 mg daily, managed by [employer]   - cont AC, Eliquis 5 mg bid; will provide 90 days supply and adv to get further from [employer]     Problem List Items Addressed This Visit             ICD-10-CM    Morbid (severe) obesity due to excess calories (Multi) E66.01     Cont following low calorie diet.           Other Visit Diagnoses         Codes    Type 2 diabetes mellitus without complication, without long-term current use of insulin (Multi)    -  Primary E11.9    Relevant Medications    metFORMIN (Glucophage) 500 mg tablet    empagliflozin (Jardiance) 25 mg    dulaglutide 3 mg/0.5 mL pen injector    Other Relevant Orders    Hemoglobin A1c    Lipid Panel    Atrial fibrillation, unspecified type (Multi)     I48.91    Relevant Medications     dilTIAZem CD (Cardizem CD) 120 mg 24 hr capsule    apixaban (Eliquis) 5 mg tablet    Eczema, unspecified type     L30.9    Relevant Medications    clotrimazole-betamethasone (Lotrisone) cream    Immunization due     Z23    Relevant Orders    Flu vaccine, trivalent, preservative free, HIGH-DOSE, age 65y+ (Fluzone) (Completed)          Rtc 4-5 mo for mCR?FU    Ismale Myers MD   [facility], Family Medicine

## 2024-09-25 ENCOUNTER — LAB (OUTPATIENT)
Dept: LAB | Facility: HOSPITAL | Age: 84
End: 2024-09-25
Payer: MEDICARE

## 2024-09-25 DIAGNOSIS — C77.9 MALIGNANT MELANOMA METASTATIC TO LYMPH NODE (MULTI): ICD-10-CM

## 2024-09-25 DIAGNOSIS — Z51.12 ENCOUNTER FOR ANTINEOPLASTIC IMMUNOTHERAPY: ICD-10-CM

## 2024-09-25 LAB
ALBUMIN SERPL BCP-MCNC: 4.4 G/DL (ref 3.4–5)
ALP SERPL-CCNC: 112 U/L (ref 33–136)
ALT SERPL W P-5'-P-CCNC: 13 U/L (ref 10–52)
ANION GAP SERPL CALC-SCNC: 11 MMOL/L (ref 10–20)
AST SERPL W P-5'-P-CCNC: 20 U/L (ref 9–39)
BASOPHILS # BLD AUTO: 0.02 X10*3/UL (ref 0–0.1)
BASOPHILS NFR BLD AUTO: 0.3 %
BILIRUB SERPL-MCNC: 0.7 MG/DL (ref 0–1.2)
BUN SERPL-MCNC: 20 MG/DL (ref 6–23)
CALCIUM SERPL-MCNC: 9 MG/DL (ref 8.6–10.3)
CHLORIDE SERPL-SCNC: 104 MMOL/L (ref 98–107)
CO2 SERPL-SCNC: 27 MMOL/L (ref 21–32)
CORTIS AM PEAK SERPL-MSCNC: 10.8 UG/DL (ref 5–20)
CREAT SERPL-MCNC: 1.19 MG/DL (ref 0.5–1.3)
EGFRCR SERPLBLD CKD-EPI 2021: 61 ML/MIN/1.73M*2
EOSINOPHIL # BLD AUTO: 0.26 X10*3/UL (ref 0–0.4)
EOSINOPHIL NFR BLD AUTO: 3.9 %
ERYTHROCYTE [DISTWIDTH] IN BLOOD BY AUTOMATED COUNT: 13.8 % (ref 11.5–14.5)
GLUCOSE SERPL-MCNC: 173 MG/DL (ref 74–99)
HCT VFR BLD AUTO: 46.5 % (ref 41–52)
HGB BLD-MCNC: 15.4 G/DL (ref 13.5–17.5)
IMM GRANULOCYTES # BLD AUTO: 0.03 X10*3/UL (ref 0–0.5)
IMM GRANULOCYTES NFR BLD AUTO: 0.4 % (ref 0–0.9)
LYMPHOCYTES # BLD AUTO: 2.18 X10*3/UL (ref 0.8–3)
LYMPHOCYTES NFR BLD AUTO: 32.6 %
MCH RBC QN AUTO: 30.1 PG (ref 26–34)
MCHC RBC AUTO-ENTMCNC: 33.1 G/DL (ref 32–36)
MCV RBC AUTO: 91 FL (ref 80–100)
MONOCYTES # BLD AUTO: 0.51 X10*3/UL (ref 0.05–0.8)
MONOCYTES NFR BLD AUTO: 7.6 %
NEUTROPHILS # BLD AUTO: 3.68 X10*3/UL (ref 1.6–5.5)
NEUTROPHILS NFR BLD AUTO: 55.2 %
PLATELET # BLD AUTO: 181 X10*3/UL (ref 150–450)
POTASSIUM SERPL-SCNC: 4.3 MMOL/L (ref 3.5–5.3)
PROT SERPL-MCNC: 7.1 G/DL (ref 6.4–8.2)
RBC # BLD AUTO: 5.11 X10*6/UL (ref 4.5–5.9)
SODIUM SERPL-SCNC: 138 MMOL/L (ref 136–145)
T4 FREE SERPL-MCNC: 0.71 NG/DL (ref 0.61–1.12)
TSH SERPL-ACNC: 11.8 MIU/L (ref 0.44–3.98)
WBC # BLD AUTO: 6.7 X10*3/UL (ref 4.4–11.3)

## 2024-09-25 PROCEDURE — 84075 ASSAY ALKALINE PHOSPHATASE: CPT

## 2024-09-25 PROCEDURE — 84439 ASSAY OF FREE THYROXINE: CPT

## 2024-09-25 PROCEDURE — 82533 TOTAL CORTISOL: CPT | Mod: PORLAB

## 2024-09-25 PROCEDURE — 84443 ASSAY THYROID STIM HORMONE: CPT

## 2024-09-25 PROCEDURE — 82024 ASSAY OF ACTH: CPT

## 2024-09-25 PROCEDURE — 85025 COMPLETE CBC W/AUTO DIFF WBC: CPT

## 2024-09-25 PROCEDURE — 36415 COLL VENOUS BLD VENIPUNCTURE: CPT

## 2024-09-27 ENCOUNTER — INFUSION (OUTPATIENT)
Dept: HEMATOLOGY/ONCOLOGY | Facility: CLINIC | Age: 84
End: 2024-09-27
Payer: MEDICARE

## 2024-09-27 ENCOUNTER — SOCIAL WORK (OUTPATIENT)
Dept: HEMATOLOGY/ONCOLOGY | Facility: CLINIC | Age: 84
End: 2024-09-27
Payer: MEDICARE

## 2024-09-27 VITALS
DIASTOLIC BLOOD PRESSURE: 84 MMHG | BODY MASS INDEX: 37.95 KG/M2 | WEIGHT: 280.2 LBS | HEART RATE: 77 BPM | SYSTOLIC BLOOD PRESSURE: 129 MMHG | OXYGEN SATURATION: 98 % | RESPIRATION RATE: 16 BRPM | HEIGHT: 72 IN | TEMPERATURE: 97.2 F

## 2024-09-27 DIAGNOSIS — Z51.12 ENCOUNTER FOR ANTINEOPLASTIC IMMUNOTHERAPY: ICD-10-CM

## 2024-09-27 DIAGNOSIS — C77.9 MALIGNANT MELANOMA METASTATIC TO LYMPH NODE (MULTI): ICD-10-CM

## 2024-09-27 LAB — ACTH PLAS-MCNC: 24.5 PG/ML (ref 7.2–63.3)

## 2024-09-27 PROCEDURE — 96413 CHEMO IV INFUSION 1 HR: CPT

## 2024-09-27 PROCEDURE — 2500000004 HC RX 250 GENERAL PHARMACY W/ HCPCS (ALT 636 FOR OP/ED): Mod: JZ,JG | Performed by: INTERNAL MEDICINE

## 2024-09-27 RX ORDER — DIPHENHYDRAMINE HYDROCHLORIDE 50 MG/ML
50 INJECTION INTRAMUSCULAR; INTRAVENOUS AS NEEDED
Status: DISCONTINUED | OUTPATIENT
Start: 2024-09-27 | End: 2024-09-27 | Stop reason: HOSPADM

## 2024-09-27 RX ORDER — ALBUTEROL SULFATE 0.83 MG/ML
3 SOLUTION RESPIRATORY (INHALATION) AS NEEDED
Status: DISCONTINUED | OUTPATIENT
Start: 2024-09-27 | End: 2024-09-27 | Stop reason: HOSPADM

## 2024-09-27 RX ORDER — PROCHLORPERAZINE MALEATE 10 MG
10 TABLET ORAL EVERY 6 HOURS PRN
Status: DISCONTINUED | OUTPATIENT
Start: 2024-09-27 | End: 2024-09-27 | Stop reason: HOSPADM

## 2024-09-27 RX ORDER — HEPARIN 100 UNIT/ML
500 SYRINGE INTRAVENOUS AS NEEDED
OUTPATIENT
Start: 2024-09-27

## 2024-09-27 RX ORDER — HEPARIN SODIUM,PORCINE/PF 10 UNIT/ML
50 SYRINGE (ML) INTRAVENOUS AS NEEDED
OUTPATIENT
Start: 2024-09-27

## 2024-09-27 RX ORDER — PROCHLORPERAZINE EDISYLATE 5 MG/ML
10 INJECTION INTRAMUSCULAR; INTRAVENOUS EVERY 6 HOURS PRN
Status: DISCONTINUED | OUTPATIENT
Start: 2024-09-27 | End: 2024-09-27 | Stop reason: HOSPADM

## 2024-09-27 RX ORDER — FAMOTIDINE 10 MG/ML
20 INJECTION INTRAVENOUS ONCE AS NEEDED
Status: DISCONTINUED | OUTPATIENT
Start: 2024-09-27 | End: 2024-09-27 | Stop reason: HOSPADM

## 2024-09-27 RX ORDER — EPINEPHRINE 0.3 MG/.3ML
0.3 INJECTION SUBCUTANEOUS EVERY 5 MIN PRN
Status: DISCONTINUED | OUTPATIENT
Start: 2024-09-27 | End: 2024-09-27 | Stop reason: HOSPADM

## 2024-09-27 ASSESSMENT — PAIN SCALES - GENERAL: PAINLEVEL: 2

## 2024-09-27 NOTE — PROGRESS NOTES
Patient is here for cycle 6 of Nivolumab. Patient tolerated treatment well, is aware of his next appointment and was discharged in stable condition.

## 2024-09-27 NOTE — SIGNIFICANT EVENT

## 2024-09-27 NOTE — PROGRESS NOTES
(SW) met with patient today to assess needs and offer support.  Patient was A&Ox3 with appropriate and congruent mood and affect.   SW asked how he has been doing.  Patient stated that he continues to do pretty good.  Patient stated that he has one more treatment scheduled and then has an appointment with Dr. Morrell.   SW provided a new copy of How a  Can Help and encouraged him to reach out if he would need anything.      Juan C Springer MSW, LSW

## 2024-10-24 ENCOUNTER — LAB (OUTPATIENT)
Dept: LAB | Facility: HOSPITAL | Age: 84
End: 2024-10-24
Payer: MEDICARE

## 2024-10-24 ENCOUNTER — APPOINTMENT (OUTPATIENT)
Dept: HEMATOLOGY/ONCOLOGY | Facility: CLINIC | Age: 84
End: 2024-10-24
Payer: MEDICARE

## 2024-10-24 DIAGNOSIS — Z51.12 ENCOUNTER FOR ANTINEOPLASTIC IMMUNOTHERAPY: ICD-10-CM

## 2024-10-24 DIAGNOSIS — C77.9 MALIGNANT MELANOMA METASTATIC TO LYMPH NODE (MULTI): ICD-10-CM

## 2024-10-24 LAB
ALBUMIN SERPL BCP-MCNC: 4.1 G/DL (ref 3.4–5)
ALP SERPL-CCNC: 109 U/L (ref 33–136)
ALT SERPL W P-5'-P-CCNC: 10 U/L (ref 10–52)
ANION GAP SERPL CALC-SCNC: 12 MMOL/L (ref 10–20)
AST SERPL W P-5'-P-CCNC: 19 U/L (ref 9–39)
BASOPHILS # BLD AUTO: 0.02 X10*3/UL (ref 0–0.1)
BASOPHILS NFR BLD AUTO: 0.3 %
BILIRUB SERPL-MCNC: 0.6 MG/DL (ref 0–1.2)
BUN SERPL-MCNC: 19 MG/DL (ref 6–23)
CALCIUM SERPL-MCNC: 9.1 MG/DL (ref 8.6–10.3)
CHLORIDE SERPL-SCNC: 103 MMOL/L (ref 98–107)
CO2 SERPL-SCNC: 27 MMOL/L (ref 21–32)
CORTIS AM PEAK SERPL-MSCNC: 10.9 UG/DL (ref 5–20)
CREAT SERPL-MCNC: 1.15 MG/DL (ref 0.5–1.3)
EGFRCR SERPLBLD CKD-EPI 2021: 64 ML/MIN/1.73M*2
EOSINOPHIL # BLD AUTO: 0.26 X10*3/UL (ref 0–0.4)
EOSINOPHIL NFR BLD AUTO: 3.6 %
ERYTHROCYTE [DISTWIDTH] IN BLOOD BY AUTOMATED COUNT: 14 % (ref 11.5–14.5)
GLUCOSE SERPL-MCNC: 200 MG/DL (ref 74–99)
HCT VFR BLD AUTO: 44.9 % (ref 41–52)
HGB BLD-MCNC: 14.9 G/DL (ref 13.5–17.5)
IMM GRANULOCYTES # BLD AUTO: 0.03 X10*3/UL (ref 0–0.5)
IMM GRANULOCYTES NFR BLD AUTO: 0.4 % (ref 0–0.9)
LYMPHOCYTES # BLD AUTO: 2.03 X10*3/UL (ref 0.8–3)
LYMPHOCYTES NFR BLD AUTO: 28.1 %
MCH RBC QN AUTO: 29.9 PG (ref 26–34)
MCHC RBC AUTO-ENTMCNC: 33.2 G/DL (ref 32–36)
MCV RBC AUTO: 90 FL (ref 80–100)
MONOCYTES # BLD AUTO: 0.55 X10*3/UL (ref 0.05–0.8)
MONOCYTES NFR BLD AUTO: 7.6 %
NEUTROPHILS # BLD AUTO: 4.34 X10*3/UL (ref 1.6–5.5)
NEUTROPHILS NFR BLD AUTO: 60 %
PLATELET # BLD AUTO: 188 X10*3/UL (ref 150–450)
POTASSIUM SERPL-SCNC: 4.3 MMOL/L (ref 3.5–5.3)
PROT SERPL-MCNC: 6.7 G/DL (ref 6.4–8.2)
RBC # BLD AUTO: 4.99 X10*6/UL (ref 4.5–5.9)
SODIUM SERPL-SCNC: 138 MMOL/L (ref 136–145)
T4 FREE SERPL-MCNC: 0.69 NG/DL (ref 0.61–1.12)
TSH SERPL-ACNC: 13.21 MIU/L (ref 0.44–3.98)
WBC # BLD AUTO: 7.2 X10*3/UL (ref 4.4–11.3)

## 2024-10-24 PROCEDURE — 36415 COLL VENOUS BLD VENIPUNCTURE: CPT

## 2024-10-24 PROCEDURE — 82024 ASSAY OF ACTH: CPT

## 2024-10-24 PROCEDURE — 82533 TOTAL CORTISOL: CPT | Mod: PORLAB

## 2024-10-24 PROCEDURE — 84439 ASSAY OF FREE THYROXINE: CPT

## 2024-10-24 PROCEDURE — 85025 COMPLETE CBC W/AUTO DIFF WBC: CPT

## 2024-10-24 PROCEDURE — 80053 COMPREHEN METABOLIC PANEL: CPT

## 2024-10-24 PROCEDURE — 84443 ASSAY THYROID STIM HORMONE: CPT

## 2024-10-25 ENCOUNTER — INFUSION (OUTPATIENT)
Dept: HEMATOLOGY/ONCOLOGY | Facility: CLINIC | Age: 84
End: 2024-10-25
Payer: MEDICARE

## 2024-10-25 VITALS
HEART RATE: 81 BPM | RESPIRATION RATE: 16 BRPM | OXYGEN SATURATION: 98 % | HEIGHT: 72 IN | BODY MASS INDEX: 38.06 KG/M2 | TEMPERATURE: 98.1 F | SYSTOLIC BLOOD PRESSURE: 128 MMHG | DIASTOLIC BLOOD PRESSURE: 83 MMHG | WEIGHT: 281 LBS

## 2024-10-25 DIAGNOSIS — C77.9 MALIGNANT MELANOMA METASTATIC TO LYMPH NODE (MULTI): ICD-10-CM

## 2024-10-25 DIAGNOSIS — Z51.12 ENCOUNTER FOR ANTINEOPLASTIC IMMUNOTHERAPY: ICD-10-CM

## 2024-10-25 PROCEDURE — 96413 CHEMO IV INFUSION 1 HR: CPT

## 2024-10-25 PROCEDURE — 2500000004 HC RX 250 GENERAL PHARMACY W/ HCPCS (ALT 636 FOR OP/ED): Performed by: INTERNAL MEDICINE

## 2024-10-25 RX ORDER — PROCHLORPERAZINE EDISYLATE 5 MG/ML
10 INJECTION INTRAMUSCULAR; INTRAVENOUS EVERY 6 HOURS PRN
Status: DISCONTINUED | OUTPATIENT
Start: 2024-10-25 | End: 2024-10-25 | Stop reason: HOSPADM

## 2024-10-25 RX ORDER — PROCHLORPERAZINE MALEATE 10 MG
10 TABLET ORAL EVERY 6 HOURS PRN
Status: DISCONTINUED | OUTPATIENT
Start: 2024-10-25 | End: 2024-10-25 | Stop reason: HOSPADM

## 2024-10-25 RX ORDER — FAMOTIDINE 10 MG/ML
20 INJECTION INTRAVENOUS ONCE AS NEEDED
Status: DISCONTINUED | OUTPATIENT
Start: 2024-10-25 | End: 2024-10-25 | Stop reason: HOSPADM

## 2024-10-25 RX ORDER — DIPHENHYDRAMINE HYDROCHLORIDE 50 MG/ML
50 INJECTION INTRAMUSCULAR; INTRAVENOUS AS NEEDED
Status: DISCONTINUED | OUTPATIENT
Start: 2024-10-25 | End: 2024-10-25 | Stop reason: HOSPADM

## 2024-10-25 RX ORDER — EPINEPHRINE 0.3 MG/.3ML
0.3 INJECTION SUBCUTANEOUS EVERY 5 MIN PRN
Status: DISCONTINUED | OUTPATIENT
Start: 2024-10-25 | End: 2024-10-25 | Stop reason: HOSPADM

## 2024-10-25 RX ORDER — ALBUTEROL SULFATE 0.83 MG/ML
3 SOLUTION RESPIRATORY (INHALATION) AS NEEDED
Status: DISCONTINUED | OUTPATIENT
Start: 2024-10-25 | End: 2024-10-25 | Stop reason: HOSPADM

## 2024-10-25 ASSESSMENT — PAIN SCALES - GENERAL: PAINLEVEL_OUTOF10: 0-NO PAIN

## 2024-10-25 NOTE — PROGRESS NOTES
Pt here for nivo infusion. Tolerated well. He is aware of plan of care, will call with further questions or concerns. Will return Monday for FUV with Dr Morrell, Dr Morrell notified of trending up TSH

## 2024-10-25 NOTE — SIGNIFICANT EVENT
10/25/24 1053   Prechemo Checklist   Has the patient been in the hospital, ED, or urgent care since last date of service No   Protocol/Indications Verified Yes   Confirmed to previous date/time of medication Yes   Compared to previous dose Yes   All medications are dated accurately Yes   Pregnancy Test Negative Not applicable   Parameters Met Yes   BSA/Weight-Height Verified Yes   Dose Calculations Verified (current, total, cumulative) Yes

## 2024-10-26 LAB — ACTH PLAS-MCNC: 26.3 PG/ML (ref 7.2–63.3)

## 2024-10-28 ENCOUNTER — OFFICE VISIT (OUTPATIENT)
Dept: HEMATOLOGY/ONCOLOGY | Facility: CLINIC | Age: 84
End: 2024-10-28
Payer: MEDICARE

## 2024-10-28 VITALS
BODY MASS INDEX: 38.88 KG/M2 | HEIGHT: 72 IN | SYSTOLIC BLOOD PRESSURE: 129 MMHG | HEART RATE: 76 BPM | WEIGHT: 287.04 LBS | TEMPERATURE: 97.3 F | RESPIRATION RATE: 16 BRPM | OXYGEN SATURATION: 97 % | DIASTOLIC BLOOD PRESSURE: 85 MMHG

## 2024-10-28 DIAGNOSIS — Z51.12 ENCOUNTER FOR ANTINEOPLASTIC IMMUNOTHERAPY: ICD-10-CM

## 2024-10-28 DIAGNOSIS — C77.9 MALIGNANT MELANOMA METASTATIC TO LYMPH NODE (MULTI): ICD-10-CM

## 2024-10-28 PROCEDURE — 1123F ACP DISCUSS/DSCN MKR DOCD: CPT | Performed by: INTERNAL MEDICINE

## 2024-10-28 PROCEDURE — 99214 OFFICE O/P EST MOD 30 MIN: CPT | Performed by: INTERNAL MEDICINE

## 2024-10-28 PROCEDURE — 1036F TOBACCO NON-USER: CPT | Performed by: INTERNAL MEDICINE

## 2024-10-28 PROCEDURE — 1159F MED LIST DOCD IN RCRD: CPT | Performed by: INTERNAL MEDICINE

## 2024-10-28 PROCEDURE — 1126F AMNT PAIN NOTED NONE PRSNT: CPT | Performed by: INTERNAL MEDICINE

## 2024-10-28 PROCEDURE — 1160F RVW MEDS BY RX/DR IN RCRD: CPT | Performed by: INTERNAL MEDICINE

## 2024-10-28 RX ORDER — HEPARIN SODIUM,PORCINE/PF 10 UNIT/ML
50 SYRINGE (ML) INTRAVENOUS AS NEEDED
OUTPATIENT
Start: 2024-10-28

## 2024-10-28 RX ORDER — HEPARIN 100 UNIT/ML
500 SYRINGE INTRAVENOUS AS NEEDED
OUTPATIENT
Start: 2024-10-28

## 2024-10-28 ASSESSMENT — PAIN SCALES - GENERAL: PAINLEVEL_OUTOF10: 0-NO PAIN

## 2024-11-21 ENCOUNTER — LAB (OUTPATIENT)
Dept: LAB | Facility: HOSPITAL | Age: 84
End: 2024-11-21
Payer: MEDICARE

## 2024-11-21 DIAGNOSIS — C77.9 MALIGNANT MELANOMA METASTATIC TO LYMPH NODE (MULTI): ICD-10-CM

## 2024-11-21 DIAGNOSIS — Z51.12 ENCOUNTER FOR ANTINEOPLASTIC IMMUNOTHERAPY: ICD-10-CM

## 2024-11-21 LAB
ALBUMIN SERPL BCP-MCNC: 4.3 G/DL (ref 3.4–5)
ALP SERPL-CCNC: 100 U/L (ref 33–136)
ALT SERPL W P-5'-P-CCNC: 15 U/L (ref 10–52)
ANION GAP SERPL CALC-SCNC: 12 MMOL/L (ref 10–20)
AST SERPL W P-5'-P-CCNC: 23 U/L (ref 9–39)
BASOPHILS # BLD AUTO: 0.03 X10*3/UL (ref 0–0.1)
BASOPHILS NFR BLD AUTO: 0.4 %
BILIRUB SERPL-MCNC: 0.8 MG/DL (ref 0–1.2)
BUN SERPL-MCNC: 17 MG/DL (ref 6–23)
CALCIUM SERPL-MCNC: 9.1 MG/DL (ref 8.6–10.3)
CHLORIDE SERPL-SCNC: 103 MMOL/L (ref 98–107)
CO2 SERPL-SCNC: 27 MMOL/L (ref 21–32)
CORTIS AM PEAK SERPL-MSCNC: 15.5 UG/DL (ref 5–20)
CREAT SERPL-MCNC: 1.11 MG/DL (ref 0.5–1.3)
EGFRCR SERPLBLD CKD-EPI 2021: 66 ML/MIN/1.73M*2
EOSINOPHIL # BLD AUTO: 0.24 X10*3/UL (ref 0–0.4)
EOSINOPHIL NFR BLD AUTO: 2.8 %
ERYTHROCYTE [DISTWIDTH] IN BLOOD BY AUTOMATED COUNT: 13.6 % (ref 11.5–14.5)
GLUCOSE SERPL-MCNC: 201 MG/DL (ref 74–99)
HCT VFR BLD AUTO: 46.5 % (ref 41–52)
HGB BLD-MCNC: 15.3 G/DL (ref 13.5–17.5)
IMM GRANULOCYTES # BLD AUTO: 0.03 X10*3/UL (ref 0–0.5)
IMM GRANULOCYTES NFR BLD AUTO: 0.4 % (ref 0–0.9)
LYMPHOCYTES # BLD AUTO: 2.03 X10*3/UL (ref 0.8–3)
LYMPHOCYTES NFR BLD AUTO: 23.9 %
MCH RBC QN AUTO: 29.8 PG (ref 26–34)
MCHC RBC AUTO-ENTMCNC: 32.9 G/DL (ref 32–36)
MCV RBC AUTO: 91 FL (ref 80–100)
MONOCYTES # BLD AUTO: 0.72 X10*3/UL (ref 0.05–0.8)
MONOCYTES NFR BLD AUTO: 8.5 %
NEUTROPHILS # BLD AUTO: 5.45 X10*3/UL (ref 1.6–5.5)
NEUTROPHILS NFR BLD AUTO: 64 %
PLATELET # BLD AUTO: 193 X10*3/UL (ref 150–450)
POTASSIUM SERPL-SCNC: 4.2 MMOL/L (ref 3.5–5.3)
PROT SERPL-MCNC: 7.1 G/DL (ref 6.4–8.2)
RBC # BLD AUTO: 5.13 X10*6/UL (ref 4.5–5.9)
SODIUM SERPL-SCNC: 138 MMOL/L (ref 136–145)
T4 FREE SERPL-MCNC: 0.77 NG/DL (ref 0.61–1.12)
TSH SERPL-ACNC: 15.52 MIU/L (ref 0.44–3.98)
WBC # BLD AUTO: 8.5 X10*3/UL (ref 4.4–11.3)

## 2024-11-21 PROCEDURE — 82533 TOTAL CORTISOL: CPT | Mod: PORLAB

## 2024-11-21 PROCEDURE — 85025 COMPLETE CBC W/AUTO DIFF WBC: CPT

## 2024-11-21 PROCEDURE — 82024 ASSAY OF ACTH: CPT

## 2024-11-21 PROCEDURE — 36415 COLL VENOUS BLD VENIPUNCTURE: CPT

## 2024-11-21 PROCEDURE — 80053 COMPREHEN METABOLIC PANEL: CPT

## 2024-11-21 PROCEDURE — 84443 ASSAY THYROID STIM HORMONE: CPT

## 2024-11-21 PROCEDURE — 84439 ASSAY OF FREE THYROXINE: CPT

## 2024-11-23 LAB — ACTH PLAS-MCNC: 31.3 PG/ML (ref 7.2–63.3)

## 2024-11-25 ENCOUNTER — INFUSION (OUTPATIENT)
Dept: HEMATOLOGY/ONCOLOGY | Facility: CLINIC | Age: 84
End: 2024-11-25
Payer: MEDICARE

## 2024-11-25 VITALS
TEMPERATURE: 96.6 F | HEART RATE: 75 BPM | HEIGHT: 72 IN | WEIGHT: 285.2 LBS | SYSTOLIC BLOOD PRESSURE: 133 MMHG | RESPIRATION RATE: 16 BRPM | BODY MASS INDEX: 38.63 KG/M2 | DIASTOLIC BLOOD PRESSURE: 87 MMHG | OXYGEN SATURATION: 99 %

## 2024-11-25 DIAGNOSIS — C77.9 MALIGNANT MELANOMA METASTATIC TO LYMPH NODE (MULTI): ICD-10-CM

## 2024-11-25 DIAGNOSIS — Z51.12 ENCOUNTER FOR ANTINEOPLASTIC IMMUNOTHERAPY: ICD-10-CM

## 2024-11-25 PROCEDURE — 96413 CHEMO IV INFUSION 1 HR: CPT

## 2024-11-25 PROCEDURE — 2500000004 HC RX 250 GENERAL PHARMACY W/ HCPCS (ALT 636 FOR OP/ED): Mod: JZ,JG | Performed by: INTERNAL MEDICINE

## 2024-11-25 RX ORDER — EPINEPHRINE 0.3 MG/.3ML
0.3 INJECTION SUBCUTANEOUS EVERY 5 MIN PRN
Status: DISCONTINUED | OUTPATIENT
Start: 2024-11-25 | End: 2024-11-25 | Stop reason: HOSPADM

## 2024-11-25 RX ORDER — FAMOTIDINE 10 MG/ML
20 INJECTION INTRAVENOUS ONCE AS NEEDED
Status: DISCONTINUED | OUTPATIENT
Start: 2024-11-25 | End: 2024-11-25 | Stop reason: HOSPADM

## 2024-11-25 RX ORDER — DIPHENHYDRAMINE HYDROCHLORIDE 50 MG/ML
50 INJECTION INTRAMUSCULAR; INTRAVENOUS AS NEEDED
Status: DISCONTINUED | OUTPATIENT
Start: 2024-11-25 | End: 2024-11-25 | Stop reason: HOSPADM

## 2024-11-25 RX ORDER — ALBUTEROL SULFATE 0.83 MG/ML
3 SOLUTION RESPIRATORY (INHALATION) AS NEEDED
Status: DISCONTINUED | OUTPATIENT
Start: 2024-11-25 | End: 2024-11-25 | Stop reason: HOSPADM

## 2024-11-25 RX ORDER — PROCHLORPERAZINE EDISYLATE 5 MG/ML
10 INJECTION INTRAMUSCULAR; INTRAVENOUS EVERY 6 HOURS PRN
Status: DISCONTINUED | OUTPATIENT
Start: 2024-11-25 | End: 2024-11-25 | Stop reason: HOSPADM

## 2024-11-25 RX ORDER — PROCHLORPERAZINE MALEATE 10 MG
10 TABLET ORAL EVERY 6 HOURS PRN
Status: DISCONTINUED | OUTPATIENT
Start: 2024-11-25 | End: 2024-11-25 | Stop reason: HOSPADM

## 2024-11-25 ASSESSMENT — PAIN SCALES - GENERAL: PAINLEVEL_OUTOF10: 0-NO PAIN

## 2024-11-25 NOTE — PROGRESS NOTES
Ji is here today for nivolumab, tolerated well. Discharged in stable condition, no further needs at this time. Has schedule for follow up.

## 2024-12-20 ENCOUNTER — INFUSION (OUTPATIENT)
Dept: HEMATOLOGY/ONCOLOGY | Facility: CLINIC | Age: 84
End: 2024-12-20
Payer: MEDICARE

## 2024-12-20 VITALS
DIASTOLIC BLOOD PRESSURE: 81 MMHG | TEMPERATURE: 97.9 F | WEIGHT: 286 LBS | HEIGHT: 72 IN | SYSTOLIC BLOOD PRESSURE: 141 MMHG | RESPIRATION RATE: 16 BRPM | BODY MASS INDEX: 38.74 KG/M2 | OXYGEN SATURATION: 99 % | HEART RATE: 78 BPM

## 2024-12-20 DIAGNOSIS — Z51.12 ENCOUNTER FOR ANTINEOPLASTIC IMMUNOTHERAPY: ICD-10-CM

## 2024-12-20 DIAGNOSIS — C77.9 MALIGNANT MELANOMA METASTATIC TO LYMPH NODE (MULTI): ICD-10-CM

## 2024-12-20 LAB
ALBUMIN SERPL BCP-MCNC: 4.2 G/DL (ref 3.4–5)
ALP SERPL-CCNC: 99 U/L (ref 33–136)
ALT SERPL W P-5'-P-CCNC: 11 U/L (ref 10–52)
ANION GAP SERPL CALC-SCNC: 11 MMOL/L (ref 10–20)
AST SERPL W P-5'-P-CCNC: 17 U/L (ref 9–39)
BASOPHILS # BLD AUTO: 0.03 X10*3/UL (ref 0–0.1)
BASOPHILS NFR BLD AUTO: 0.4 %
BILIRUB SERPL-MCNC: 0.8 MG/DL (ref 0–1.2)
BUN SERPL-MCNC: 14 MG/DL (ref 6–23)
CALCIUM SERPL-MCNC: 9.2 MG/DL (ref 8.6–10.3)
CHLORIDE SERPL-SCNC: 104 MMOL/L (ref 98–107)
CO2 SERPL-SCNC: 27 MMOL/L (ref 21–32)
CORTIS AM PEAK SERPL-MSCNC: 21.6 UG/DL (ref 5–20)
CREAT SERPL-MCNC: 1.1 MG/DL (ref 0.5–1.3)
EGFRCR SERPLBLD CKD-EPI 2021: 67 ML/MIN/1.73M*2
EOSINOPHIL # BLD AUTO: 0.23 X10*3/UL (ref 0–0.4)
EOSINOPHIL NFR BLD AUTO: 2.8 %
ERYTHROCYTE [DISTWIDTH] IN BLOOD BY AUTOMATED COUNT: 13.6 % (ref 11.5–14.5)
GLUCOSE SERPL-MCNC: 176 MG/DL (ref 74–99)
HCT VFR BLD AUTO: 44.9 % (ref 41–52)
HGB BLD-MCNC: 14.6 G/DL (ref 13.5–17.5)
IMM GRANULOCYTES # BLD AUTO: 0.02 X10*3/UL (ref 0–0.5)
IMM GRANULOCYTES NFR BLD AUTO: 0.2 % (ref 0–0.9)
LYMPHOCYTES # BLD AUTO: 2.17 X10*3/UL (ref 0.8–3)
LYMPHOCYTES NFR BLD AUTO: 26.5 %
MCH RBC QN AUTO: 29.7 PG (ref 26–34)
MCHC RBC AUTO-ENTMCNC: 32.5 G/DL (ref 32–36)
MCV RBC AUTO: 91 FL (ref 80–100)
MONOCYTES # BLD AUTO: 0.69 X10*3/UL (ref 0.05–0.8)
MONOCYTES NFR BLD AUTO: 8.4 %
NEUTROPHILS # BLD AUTO: 5.05 X10*3/UL (ref 1.6–5.5)
NEUTROPHILS NFR BLD AUTO: 61.7 %
PLATELET # BLD AUTO: 177 X10*3/UL (ref 150–450)
POTASSIUM SERPL-SCNC: 4.3 MMOL/L (ref 3.5–5.3)
PROT SERPL-MCNC: 6.9 G/DL (ref 6.4–8.2)
RBC # BLD AUTO: 4.91 X10*6/UL (ref 4.5–5.9)
SODIUM SERPL-SCNC: 138 MMOL/L (ref 136–145)
T4 FREE SERPL-MCNC: 0.68 NG/DL (ref 0.61–1.12)
TSH SERPL-ACNC: 18.07 MIU/L (ref 0.44–3.98)
WBC # BLD AUTO: 8.2 X10*3/UL (ref 4.4–11.3)

## 2024-12-20 PROCEDURE — 96413 CHEMO IV INFUSION 1 HR: CPT

## 2024-12-20 PROCEDURE — 85025 COMPLETE CBC W/AUTO DIFF WBC: CPT

## 2024-12-20 PROCEDURE — 82533 TOTAL CORTISOL: CPT | Mod: PORLAB

## 2024-12-20 PROCEDURE — 84443 ASSAY THYROID STIM HORMONE: CPT

## 2024-12-20 PROCEDURE — 82024 ASSAY OF ACTH: CPT

## 2024-12-20 PROCEDURE — 80053 COMPREHEN METABOLIC PANEL: CPT

## 2024-12-20 PROCEDURE — 84439 ASSAY OF FREE THYROXINE: CPT

## 2024-12-20 PROCEDURE — 2500000004 HC RX 250 GENERAL PHARMACY W/ HCPCS (ALT 636 FOR OP/ED): Mod: JZ,JG | Performed by: INTERNAL MEDICINE

## 2024-12-20 RX ORDER — ALBUTEROL SULFATE 0.83 MG/ML
3 SOLUTION RESPIRATORY (INHALATION) AS NEEDED
Status: DISCONTINUED | OUTPATIENT
Start: 2024-12-20 | End: 2024-12-20 | Stop reason: HOSPADM

## 2024-12-20 RX ORDER — PROCHLORPERAZINE MALEATE 10 MG
10 TABLET ORAL EVERY 6 HOURS PRN
Status: DISCONTINUED | OUTPATIENT
Start: 2024-12-20 | End: 2024-12-20 | Stop reason: HOSPADM

## 2024-12-20 RX ORDER — DIPHENHYDRAMINE HYDROCHLORIDE 50 MG/ML
50 INJECTION INTRAMUSCULAR; INTRAVENOUS AS NEEDED
Status: DISCONTINUED | OUTPATIENT
Start: 2024-12-20 | End: 2024-12-20 | Stop reason: HOSPADM

## 2024-12-20 RX ORDER — PROCHLORPERAZINE EDISYLATE 5 MG/ML
10 INJECTION INTRAMUSCULAR; INTRAVENOUS EVERY 6 HOURS PRN
Status: DISCONTINUED | OUTPATIENT
Start: 2024-12-20 | End: 2024-12-20 | Stop reason: HOSPADM

## 2024-12-20 RX ORDER — FAMOTIDINE 10 MG/ML
20 INJECTION INTRAVENOUS ONCE AS NEEDED
Status: DISCONTINUED | OUTPATIENT
Start: 2024-12-20 | End: 2024-12-20 | Stop reason: HOSPADM

## 2024-12-20 RX ORDER — EPINEPHRINE 0.3 MG/.3ML
0.3 INJECTION SUBCUTANEOUS EVERY 5 MIN PRN
Status: DISCONTINUED | OUTPATIENT
Start: 2024-12-20 | End: 2024-12-20 | Stop reason: HOSPADM

## 2024-12-20 ASSESSMENT — PAIN SCALES - GENERAL: PAINLEVEL_OUTOF10: 0-NO PAIN

## 2024-12-20 NOTE — PROGRESS NOTES
Ji is here for nivolumab infusion, tolerated well. Discharged in stable condition, no further needs at this time.

## 2024-12-21 LAB — ACTH PLAS-MCNC: 21.6 PG/ML (ref 7.2–63.3)

## 2025-01-15 ENCOUNTER — LAB (OUTPATIENT)
Dept: LAB | Facility: HOSPITAL | Age: 85
End: 2025-01-15
Payer: MEDICARE

## 2025-01-15 DIAGNOSIS — Z51.12 ENCOUNTER FOR ANTINEOPLASTIC IMMUNOTHERAPY: ICD-10-CM

## 2025-01-15 DIAGNOSIS — C77.9 MALIGNANT MELANOMA METASTATIC TO LYMPH NODE (MULTI): ICD-10-CM

## 2025-01-15 LAB
ALBUMIN SERPL BCP-MCNC: 4.3 G/DL (ref 3.4–5)
ALP SERPL-CCNC: 105 U/L (ref 33–136)
ALT SERPL W P-5'-P-CCNC: 10 U/L (ref 10–52)
ANION GAP SERPL CALC-SCNC: 12 MMOL/L (ref 10–20)
AST SERPL W P-5'-P-CCNC: 19 U/L (ref 9–39)
BASOPHILS # BLD AUTO: 0.03 X10*3/UL (ref 0–0.1)
BASOPHILS NFR BLD AUTO: 0.4 %
BILIRUB SERPL-MCNC: 0.8 MG/DL (ref 0–1.2)
BUN SERPL-MCNC: 15 MG/DL (ref 6–23)
CALCIUM SERPL-MCNC: 9 MG/DL (ref 8.6–10.3)
CHLORIDE SERPL-SCNC: 102 MMOL/L (ref 98–107)
CO2 SERPL-SCNC: 27 MMOL/L (ref 21–32)
CORTIS AM PEAK SERPL-MSCNC: 16.9 UG/DL (ref 5–20)
CREAT SERPL-MCNC: 1.15 MG/DL (ref 0.5–1.3)
EGFRCR SERPLBLD CKD-EPI 2021: 64 ML/MIN/1.73M*2
EOSINOPHIL # BLD AUTO: 0.21 X10*3/UL (ref 0–0.4)
EOSINOPHIL NFR BLD AUTO: 3 %
ERYTHROCYTE [DISTWIDTH] IN BLOOD BY AUTOMATED COUNT: 13.8 % (ref 11.5–14.5)
GLUCOSE SERPL-MCNC: 188 MG/DL (ref 74–99)
HCT VFR BLD AUTO: 46.2 % (ref 41–52)
HGB BLD-MCNC: 15.2 G/DL (ref 13.5–17.5)
IMM GRANULOCYTES # BLD AUTO: 0.03 X10*3/UL (ref 0–0.5)
IMM GRANULOCYTES NFR BLD AUTO: 0.4 % (ref 0–0.9)
LYMPHOCYTES # BLD AUTO: 1.89 X10*3/UL (ref 0.8–3)
LYMPHOCYTES NFR BLD AUTO: 26.8 %
MCH RBC QN AUTO: 30.3 PG (ref 26–34)
MCHC RBC AUTO-ENTMCNC: 32.9 G/DL (ref 32–36)
MCV RBC AUTO: 92 FL (ref 80–100)
MONOCYTES # BLD AUTO: 0.64 X10*3/UL (ref 0.05–0.8)
MONOCYTES NFR BLD AUTO: 9.1 %
NEUTROPHILS # BLD AUTO: 4.26 X10*3/UL (ref 1.6–5.5)
NEUTROPHILS NFR BLD AUTO: 60.3 %
PLATELET # BLD AUTO: 192 X10*3/UL (ref 150–450)
POTASSIUM SERPL-SCNC: 4 MMOL/L (ref 3.5–5.3)
PROT SERPL-MCNC: 6.7 G/DL (ref 6.4–8.2)
RBC # BLD AUTO: 5.01 X10*6/UL (ref 4.5–5.9)
SODIUM SERPL-SCNC: 137 MMOL/L (ref 136–145)
T4 FREE SERPL-MCNC: 0.67 NG/DL (ref 0.61–1.12)
TSH SERPL-ACNC: 24.11 MIU/L (ref 0.44–3.98)
WBC # BLD AUTO: 7.1 X10*3/UL (ref 4.4–11.3)

## 2025-01-15 PROCEDURE — 84439 ASSAY OF FREE THYROXINE: CPT

## 2025-01-15 PROCEDURE — 80053 COMPREHEN METABOLIC PANEL: CPT

## 2025-01-15 PROCEDURE — 85025 COMPLETE CBC W/AUTO DIFF WBC: CPT

## 2025-01-15 PROCEDURE — 84443 ASSAY THYROID STIM HORMONE: CPT

## 2025-01-15 PROCEDURE — 82533 TOTAL CORTISOL: CPT | Mod: PORLAB

## 2025-01-15 PROCEDURE — 82024 ASSAY OF ACTH: CPT

## 2025-01-15 PROCEDURE — 36415 COLL VENOUS BLD VENIPUNCTURE: CPT

## 2025-01-17 ENCOUNTER — SOCIAL WORK (OUTPATIENT)
Dept: HEMATOLOGY/ONCOLOGY | Facility: CLINIC | Age: 85
End: 2025-01-17
Payer: MEDICARE

## 2025-01-17 ENCOUNTER — INFUSION (OUTPATIENT)
Dept: HEMATOLOGY/ONCOLOGY | Facility: CLINIC | Age: 85
End: 2025-01-17
Payer: MEDICARE

## 2025-01-17 VITALS
TEMPERATURE: 96.6 F | OXYGEN SATURATION: 97 % | RESPIRATION RATE: 16 BRPM | HEIGHT: 72 IN | BODY MASS INDEX: 38.36 KG/M2 | SYSTOLIC BLOOD PRESSURE: 142 MMHG | HEART RATE: 78 BPM | WEIGHT: 283.2 LBS | DIASTOLIC BLOOD PRESSURE: 87 MMHG

## 2025-01-17 DIAGNOSIS — C77.9 MALIGNANT MELANOMA METASTATIC TO LYMPH NODE (MULTI): ICD-10-CM

## 2025-01-17 DIAGNOSIS — Z51.12 ENCOUNTER FOR ANTINEOPLASTIC IMMUNOTHERAPY: ICD-10-CM

## 2025-01-17 LAB — ACTH PLAS-MCNC: 27 PG/ML (ref 7.2–63.3)

## 2025-01-17 PROCEDURE — 2500000004 HC RX 250 GENERAL PHARMACY W/ HCPCS (ALT 636 FOR OP/ED): Performed by: INTERNAL MEDICINE

## 2025-01-17 PROCEDURE — 96413 CHEMO IV INFUSION 1 HR: CPT

## 2025-01-17 RX ORDER — FAMOTIDINE 10 MG/ML
20 INJECTION INTRAVENOUS ONCE AS NEEDED
Status: DISCONTINUED | OUTPATIENT
Start: 2025-01-17 | End: 2025-01-17 | Stop reason: HOSPADM

## 2025-01-17 RX ORDER — DIPHENHYDRAMINE HYDROCHLORIDE 50 MG/ML
50 INJECTION INTRAMUSCULAR; INTRAVENOUS AS NEEDED
Status: DISCONTINUED | OUTPATIENT
Start: 2025-01-17 | End: 2025-01-17 | Stop reason: HOSPADM

## 2025-01-17 RX ORDER — PROCHLORPERAZINE EDISYLATE 5 MG/ML
10 INJECTION INTRAMUSCULAR; INTRAVENOUS EVERY 6 HOURS PRN
Status: DISCONTINUED | OUTPATIENT
Start: 2025-01-17 | End: 2025-01-17 | Stop reason: HOSPADM

## 2025-01-17 RX ORDER — PROCHLORPERAZINE MALEATE 10 MG
10 TABLET ORAL EVERY 6 HOURS PRN
Status: DISCONTINUED | OUTPATIENT
Start: 2025-01-17 | End: 2025-01-17 | Stop reason: HOSPADM

## 2025-01-17 RX ORDER — ALBUTEROL SULFATE 0.83 MG/ML
3 SOLUTION RESPIRATORY (INHALATION) AS NEEDED
Status: DISCONTINUED | OUTPATIENT
Start: 2025-01-17 | End: 2025-01-17 | Stop reason: HOSPADM

## 2025-01-17 RX ORDER — EPINEPHRINE 0.3 MG/.3ML
0.3 INJECTION SUBCUTANEOUS EVERY 5 MIN PRN
Status: DISCONTINUED | OUTPATIENT
Start: 2025-01-17 | End: 2025-01-17 | Stop reason: HOSPADM

## 2025-01-17 RX ADMIN — SODIUM CHLORIDE 480 MG: 9 INJECTION, SOLUTION INTRAVENOUS at 11:35

## 2025-01-17 ASSESSMENT — PAIN SCALES - GENERAL: PAINLEVEL_OUTOF10: 0-NO PAIN

## 2025-01-17 NOTE — PROGRESS NOTES
Patient here for Nivolumab, tolerated well. Patient to return 2/14 for next treatment. Patient denies any questions at this time. Patient discharged in stable condition.

## 2025-01-17 NOTE — PROGRESS NOTES
(SW) met with patient today to assess needs and offer support.  Patient was A&Ox3 with appropriate and congruent mood and affect.  SW asked how he was doing.  Patient stated that he was doing good.  SW asked if anything new since SW saw him.  Patient stated that his grandson turned 16.  He joked and said to watch out.  SW asked patient if he needed anything.  Patient stated that he did not and still had SW information.  SW will continue to follow patient.      Juan C Springer MSW, LSW

## 2025-01-27 ENCOUNTER — OFFICE VISIT (OUTPATIENT)
Dept: HEMATOLOGY/ONCOLOGY | Facility: CLINIC | Age: 85
End: 2025-01-27
Payer: MEDICARE

## 2025-01-27 VITALS
RESPIRATION RATE: 16 BRPM | HEART RATE: 78 BPM | TEMPERATURE: 97.3 F | WEIGHT: 285.5 LBS | HEIGHT: 71 IN | DIASTOLIC BLOOD PRESSURE: 90 MMHG | BODY MASS INDEX: 39.97 KG/M2 | SYSTOLIC BLOOD PRESSURE: 145 MMHG | OXYGEN SATURATION: 98 %

## 2025-01-27 DIAGNOSIS — C77.9 MALIGNANT MELANOMA METASTATIC TO LYMPH NODE (MULTI): ICD-10-CM

## 2025-01-27 DIAGNOSIS — Z51.12 ENCOUNTER FOR ANTINEOPLASTIC IMMUNOTHERAPY: ICD-10-CM

## 2025-01-27 PROCEDURE — 1159F MED LIST DOCD IN RCRD: CPT | Performed by: INTERNAL MEDICINE

## 2025-01-27 PROCEDURE — 99213 OFFICE O/P EST LOW 20 MIN: CPT | Performed by: INTERNAL MEDICINE

## 2025-01-27 PROCEDURE — 1036F TOBACCO NON-USER: CPT | Performed by: INTERNAL MEDICINE

## 2025-01-27 PROCEDURE — 1123F ACP DISCUSS/DSCN MKR DOCD: CPT | Performed by: INTERNAL MEDICINE

## 2025-01-27 PROCEDURE — 1126F AMNT PAIN NOTED NONE PRSNT: CPT | Performed by: INTERNAL MEDICINE

## 2025-01-27 PROCEDURE — 1160F RVW MEDS BY RX/DR IN RCRD: CPT | Performed by: INTERNAL MEDICINE

## 2025-01-27 ASSESSMENT — PAIN SCALES - GENERAL: PAINLEVEL_OUTOF10: 0-NO PAIN

## 2025-01-27 NOTE — PROGRESS NOTES
Patient Visit Information:   Visit Type: Follow Up Visit      Cancer History:   Treatment Synopsis:    1.  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      This time patient presented with a left parotid gland mass      February 9, 2023 CAT scan of the neck did show 1.5 cm mass arising from or adjacent to the superficial lobe of the left parotid..     March 1, 2023, left parotid gland mass biopsy positive for melanoma.  Preauricular skin biopsy positive for invasive squamous cell carcinoma extending to deep margin      ENT evaluation done        April 16, 2023, PET scan, slight abnormal metabolic activity of left parotid gland seen, no evidence of metastatic disease      MRI brain negative for metastatic disease      May 5,   2023, status post left parotidectomy and left neck dissection      Final pathology did show metastatic melanoma measuring 2 cm surgical margin 1 mm and level 2, level 3, lymph node dissection, no evidence of lymph node metastatic disease, total 42 lymph node examined.      Current treatment, pembrolizumab, started June 26, 2023  Status post 5 doses of pembrolizumab, stopped in November 2023 because of acute arthritis   5/10/24 , nivolumab started   6/7/24 , c2  7/5/24 , c3  8/2/24 , c4  11/22/24 , c8  2/14/25 , c11  History of Present Illness:      ID Statement:    MARIA INES MORTON is a 82 year old Male        Chief Complaint: Melanoma of left parotid gland   Interval History:    Patient is a 82-year-old gentleman with history of hypertension, type 2 diabetes mellitus,  hypercholesterolemia, atrial fibrillation and melanoma of left ear which  were diagnosed 2030.      This time patient presented with a left parotid gland mass      February 9, 2023 CAT scan of the neck did show 1.5 cm mass arising from or adjacent to the superficial lobe of the left parotid..     March 1, 2023, left parotid gland mass biopsy positive for melanoma.  Preauricular skin biopsy positive for invasive squamous cell carcinoma extending to deep margin      ENT evaluation done      April 16, 2023, PET scan, slight abnormal metabolic activity of left parotid gland seen, no evidence of metastatic disease      MRI brain negative for metastatic disease      May 5,   2023, status post left parotidectomy and left neck dissection      Final pathology did show metastatic melanoma measuring 2 cm surgical margin 1 mm and level 2, level 3, lymph node dissection, no evidence of lymph node metastatic disease, total 42 lymph node examined.         Medical oncology consultation is requesting for recurrent melanoma treatment.      Postoperatively patient doing very well, patient is still active, no headache and dizziness, no bony pain, no chest pain, no shortness of breath, no hemoptysis, no nausea vomiting, no abdominal pain, no diarrhea and constipation, no dysuria or hematuria,  patient has some arthritis      PET scan, pathology MRI result discussed with patient     1/27/25     Metastatic melanoma, current treatment pembrolizumab, after second dose of pembrolizumab patient developed severe swelling of both hands with arthritis and severe muscular pain.  Pembrolizumab was stopped.  Patient still complaining of joint pain shoulder pain and swelling of small joint, today patient comes for follow-up visit.     During last visit patient noticed a small purpleish color nodules of the left neck and nivolumab was started on May 10, 2024.  So far it well-tolerated.  Patient complaining some arthritis, no chest pain no shortness  of breath, no diarrhea     Review of Systems:   Review of Systems:    As mentioned above        Allergies and Intolerances:       Allergies:         No Known Allergies: Active     Outpatient Medication Profile:  * Patient Currently Takes Medications as of 07-Aug-2023 14:35 documented in Structured Notes         atorvastatin 10 mg oral tablet : Last Dose Taken:  , 0.5 tab(s) orally once a day (at bedtime)         apixaban 5 mg oral tablet: Last Dose Taken:  , 1 tab(s) orally 2 times  a day         glipiZIDE 5 mg oral tablet: Last Dose Taken:  , 1 tab(s) orally 2 times  a day         lisinopril 20 mg oral tablet: Last Dose Taken:  , 1 tab(s) orally once  a day         metFORMIN 500 mg oral tablet: Last Dose Taken:  , 2 tab(s) orally 2 times  a day         metoprolol succinate 100 mg oral tablet, extended release: Last Dose  Taken:  , 1 tab(s) orally once a day in the evening          metoprolol succinate 50 mg oral tablet, extended release: Last Dose Taken:   , 1 tab(s) orally once a day in the morning          multivitamin Multiple Vitamins oral tablet: Last Dose Taken:  , 0.5 tab(s)  orally once a day             Medical History:         Metastatic melanoma: ICD-10: C43.9, Status: Active         Metastatic melanoma: ICD-10: C43.9, Status: Active         Malignant neoplasm of parotid gland: ICD-10: C07, Status:  Active         Head and neck cancer: ICD-10: C76.0, Status: Active         Squamous cell carcinoma of skin of sideburn: ICD-10: C44.329,  Status: Active         Hypercholesterolemia: ICD-10: E78.00, Status: Active         Diabetes mellitus: ICD-10: E11.9, Status: Active         Hypertension: ICD-10: I10, Status: Active         Metastatic melanoma to parotid gland: ICD-10: C79.89, Status:  Active         Malignant melanoma of skin of left ear and external auditory canal : ICD-10: C43.22, Status: Active     Family History: No Family History items are recorded  in the problem list.      Social History:   Social  Substance History:  ·  Smoking Status unknown if ever smoked (1)            Vitals and Measurements:   Vitals: Temp: 36.5  HR: 84  RR: 16  BP: 147/93  SPO2%:   96   Measurements: HT(cm): 181.2  WT(kg): 134.4  BSA:  2.6  BMI:  40.9   Last 3 Weights & Heights: Date:                           Weight/Scale Type:                    Height:   07-Aug-2023 14:33                134.4  kg                     181.2  cm  17-Jul-2023 08:26                135  kg                     181.2  cm  26-Jun-2023 08:25                136.4  kg                     181.2  cm      Physical Exam:      Constitutional: awake/alert/oriented x3, no distress,   Eyes: PERRL, EOMI, clear sclera   ENMT: mucous membranes moist, no apparent injury,  no lesions seen   Head/Neck: Neck supple, status post left parotidectomy  and neck dissection, well-healed surgical scar, small nodule is not noticeable just above surgical scar 1 cm which is nontender and hard and purplish color, measuring 1.5 cm   Respiratory/Thorax: Patent airways, CTAB, normal  breath sounds   Cardiovascular: Regular, rate and rhythm,   normal  S 1and S 2   Gastrointestinal: Nondistended, soft, non-tender,   , no masses palpable,  +BS,   Musculoskeletal: ROM intact, no joint swelling,   Extremities: normal extremities, finger joint swelling, some tenderness, no redness   Neurological: alert and oriented x3, intact senses,  motor, response and reflexes, normal strength   Lymphatic: No significant lymphadenopathy   Psychological: Appropriate mood and behavior   Skin: Warm and dry, no lesions, no rashes         Lab Results:       o 1 mo ago 2 mo ago 3 mo ago 4 mo ago 5 mo ago 6 mo ago    WBC  4.4 - 11.3 x10*3/uL 7.1 8.2 8.5 7.2 6.7 7.7 7.9   RBC  4.50 - 5.90 x10*6/uL 5.01 4.91 5.13 4.99 5.11 5.11 5.14   Hemoglobin  13.5 - 17.5 g/dL 15.2 14.6 15.3 14.9 15.4 15.0 15.4   Hematocrit  41.0 - 52.0 % 46.2 44.9 46.5 44.9 46.5 45.5 45.5   MCV  80 - 100 fL 92 91 91 90 91 89 89   MCH  26.0 - 34.0 pg  30.3 29.7 29.8 29.9 30.1 29.4 30.0   MCHC  32.0 - 36.0 g/dL 32.9 32.5 32.9 33.2 33.1 33.0 33.8   RDW  11.5 - 14.5 % 13.8 13.6 13.6 14.0 13.8 13.8 13.8   Platelets  150 - 450 x10*3/uL 192 1                  ·  Results            Assessment and Plan:   Assessment:     1.  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      This time patient presented with a left parotid gland mass      February 9, 2023 CAT scan of the neck did show 1.5 cm mass arising from or adjacent to the superficial lobe of the left parotid..     March 1, 2023, left parotid gland mass biopsy positive for melanoma.  Preauricular skin biopsy positive for invasive squamous cell carcinoma extending to deep margin      ENT evaluation done        April 16, 2023, PET scan, slight abnormal metabolic activity of left parotid gland seen, no evidence of metastatic disease      MRI brain negative for metastatic disease      May 5,   2023, status post left parotidectomy and left neck dissection      Final pathology did show metastatic melanoma measuring 2 cm surgical margin 1 mm and level 2, level 3, lymph node dissection, no evidence of lymph node metastatic disease, total 42 lymph node examined.     Current treatment, pembrolizumab, started June 26, 2023, was held in December 2023  Now patient has a recurrent melanoma on the basis of physical examination   5/10/24 , nivolumab started   6/7/24 , c2  7/5/24 , c3  8/2/24 , c4   10/22/24 , c8    10/28/24      1.  Metastatic  melanoma to the left parotid gland, status post left parotidectomy and left neck dissection on May 5, 2023.  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  Discussed with the patient I will continue  nivolumab  ,reevaluation after 3 months.             Time spent 20 minutes

## 2025-01-27 NOTE — PATIENT INSTRUCTIONS
Follow up visit for history of metastatic melanoma.     Continue immunotherapy with nivolumab every 28 days.     Return for follow up in 3 months at time of infusion.

## 2025-02-04 ENCOUNTER — APPOINTMENT (OUTPATIENT)
Dept: PRIMARY CARE | Facility: CLINIC | Age: 85
End: 2025-02-04
Payer: MEDICARE

## 2025-02-04 VITALS
HEART RATE: 65 BPM | BODY MASS INDEX: 38.47 KG/M2 | DIASTOLIC BLOOD PRESSURE: 83 MMHG | OXYGEN SATURATION: 99 % | HEIGHT: 72 IN | RESPIRATION RATE: 16 BRPM | WEIGHT: 284 LBS | SYSTOLIC BLOOD PRESSURE: 146 MMHG | TEMPERATURE: 96.6 F

## 2025-02-04 DIAGNOSIS — Z71.89 ACP (ADVANCE CARE PLANNING): ICD-10-CM

## 2025-02-04 DIAGNOSIS — I48.91 ATRIAL FIBRILLATION, UNSPECIFIED TYPE (MULTI): ICD-10-CM

## 2025-02-04 DIAGNOSIS — E78.49 OTHER HYPERLIPIDEMIA: ICD-10-CM

## 2025-02-04 DIAGNOSIS — Z00.00 ROUTINE GENERAL MEDICAL EXAMINATION AT HEALTH CARE FACILITY: Primary | ICD-10-CM

## 2025-02-04 DIAGNOSIS — I10 PRIMARY HYPERTENSION: ICD-10-CM

## 2025-02-04 DIAGNOSIS — Z12.5 SCREENING FOR PROSTATE CANCER: ICD-10-CM

## 2025-02-04 DIAGNOSIS — E11.9 TYPE 2 DIABETES MELLITUS WITHOUT COMPLICATION, WITHOUT LONG-TERM CURRENT USE OF INSULIN (MULTI): ICD-10-CM

## 2025-02-04 DIAGNOSIS — L30.9 ECZEMA, UNSPECIFIED TYPE: ICD-10-CM

## 2025-02-04 PROBLEM — C07 MALIGNANT NEOPLASM OF PAROTID GLAND (MULTI): Status: ACTIVE | Noted: 2023-03-01

## 2025-02-04 LAB
CHOLEST SERPL-MCNC: 140 MG/DL
CHOLEST/HDLC SERPL: 2.7 (CALC)
EST. AVERAGE GLUCOSE BLD GHB EST-MCNC: 194 MG/DL
EST. AVERAGE GLUCOSE BLD GHB EST-SCNC: 10.8 MMOL/L
HBA1C MFR BLD: 8.4 % OF TOTAL HGB
HDLC SERPL-MCNC: 51 MG/DL
LDLC SERPL CALC-MCNC: 61 MG/DL (CALC)
NONHDLC SERPL-MCNC: 89 MG/DL (CALC)
TRIGL SERPL-MCNC: 221 MG/DL

## 2025-02-04 PROCEDURE — 1170F FXNL STATUS ASSESSED: CPT | Performed by: STUDENT IN AN ORGANIZED HEALTH CARE EDUCATION/TRAINING PROGRAM

## 2025-02-04 PROCEDURE — 3079F DIAST BP 80-89 MM HG: CPT | Performed by: STUDENT IN AN ORGANIZED HEALTH CARE EDUCATION/TRAINING PROGRAM

## 2025-02-04 PROCEDURE — 99497 ADVNCD CARE PLAN 30 MIN: CPT | Performed by: STUDENT IN AN ORGANIZED HEALTH CARE EDUCATION/TRAINING PROGRAM

## 2025-02-04 PROCEDURE — 99214 OFFICE O/P EST MOD 30 MIN: CPT | Performed by: STUDENT IN AN ORGANIZED HEALTH CARE EDUCATION/TRAINING PROGRAM

## 2025-02-04 PROCEDURE — 3077F SYST BP >= 140 MM HG: CPT | Performed by: STUDENT IN AN ORGANIZED HEALTH CARE EDUCATION/TRAINING PROGRAM

## 2025-02-04 PROCEDURE — 1036F TOBACCO NON-USER: CPT | Performed by: STUDENT IN AN ORGANIZED HEALTH CARE EDUCATION/TRAINING PROGRAM

## 2025-02-04 PROCEDURE — 1123F ACP DISCUSS/DSCN MKR DOCD: CPT | Performed by: STUDENT IN AN ORGANIZED HEALTH CARE EDUCATION/TRAINING PROGRAM

## 2025-02-04 PROCEDURE — 1159F MED LIST DOCD IN RCRD: CPT | Performed by: STUDENT IN AN ORGANIZED HEALTH CARE EDUCATION/TRAINING PROGRAM

## 2025-02-04 PROCEDURE — 1160F RVW MEDS BY RX/DR IN RCRD: CPT | Performed by: STUDENT IN AN ORGANIZED HEALTH CARE EDUCATION/TRAINING PROGRAM

## 2025-02-04 PROCEDURE — G0439 PPPS, SUBSEQ VISIT: HCPCS | Performed by: STUDENT IN AN ORGANIZED HEALTH CARE EDUCATION/TRAINING PROGRAM

## 2025-02-04 RX ORDER — METFORMIN HYDROCHLORIDE 500 MG/1
1000 TABLET ORAL
Qty: 360 TABLET | Refills: 1 | Status: SHIPPED | OUTPATIENT
Start: 2025-02-04

## 2025-02-04 RX ORDER — LISINOPRIL 20 MG/1
20 TABLET ORAL DAILY
Qty: 90 TABLET | Refills: 1 | Status: SHIPPED | OUTPATIENT
Start: 2025-02-04

## 2025-02-04 RX ORDER — CLOTRIMAZOLE AND BETAMETHASONE DIPROPIONATE 10; .64 MG/G; MG/G
1 CREAM TOPICAL 2 TIMES DAILY
Qty: 30 G | Refills: 1 | Status: SHIPPED | OUTPATIENT
Start: 2025-02-04

## 2025-02-04 SDOH — ECONOMIC STABILITY: FOOD INSECURITY: WITHIN THE PAST 12 MONTHS, THE FOOD YOU BOUGHT JUST DIDN'T LAST AND YOU DIDN'T HAVE MONEY TO GET MORE.: NEVER TRUE

## 2025-02-04 SDOH — ECONOMIC STABILITY: FOOD INSECURITY: WITHIN THE PAST 12 MONTHS, YOU WORRIED THAT YOUR FOOD WOULD RUN OUT BEFORE YOU GOT MONEY TO BUY MORE.: NEVER TRUE

## 2025-02-04 ASSESSMENT — ACTIVITIES OF DAILY LIVING (ADL)
DOING_HOUSEWORK: INDEPENDENT
TAKING_MEDICATION: INDEPENDENT
DRESSING: INDEPENDENT
GROCERY_SHOPPING: INDEPENDENT
BATHING: INDEPENDENT
MANAGING_FINANCES: INDEPENDENT

## 2025-02-04 ASSESSMENT — ENCOUNTER SYMPTOMS
UNEXPECTED WEIGHT CHANGE: 0
SHORTNESS OF BREATH: 0
FEVER: 0
NAUSEA: 0
PALPITATIONS: 0
HEADACHES: 0
DIARRHEA: 0
CHILLS: 0
FATIGUE: 0
WHEEZING: 0
MUSCULOSKELETAL NEGATIVE: 1
VOMITING: 0
OCCASIONAL FEELINGS OF UNSTEADINESS: 0
CONFUSION: 0
CONSTIPATION: 0
ABDOMINAL PAIN: 0
DEPRESSION: 0
DIZZINESS: 0
LOSS OF SENSATION IN FEET: 0
COLOR CHANGE: 0
COUGH: 0

## 2025-02-04 ASSESSMENT — LIFESTYLE VARIABLES
SKIP TO QUESTIONS 9-10: 1
HOW MANY STANDARD DRINKS CONTAINING ALCOHOL DO YOU HAVE ON A TYPICAL DAY: 1 OR 2
AUDIT-C TOTAL SCORE: 1
HOW OFTEN DO YOU HAVE A DRINK CONTAINING ALCOHOL: MONTHLY OR LESS
HOW OFTEN DO YOU HAVE SIX OR MORE DRINKS ON ONE OCCASION: NEVER

## 2025-02-04 NOTE — PROGRESS NOTES
Subjective   Patient ID: Ji Naidu is a 84 y.o. male who presents for Medicare Annual Wellness Visit Subsequent, Follow-up (Pt has no new concerns.  Pt has a rash on his back that he was prescribed a cream for, the area has spread now and he can't reach it to apply the cream. ), and GI Problem (Pt is having some GI issues).      Subjective   Reason for Visit: Ji Naidu is an 84 y.o. male here for a Medicare Wellness visit and follow up visit. Reports his rash on back has sl spread more, prev was put on lotrisone, works well but hard to put on back.     # DM2  - last A1c  8.3 (09/9/24) and recent 8.4 (2/3/25)  - takes metformin 500 mg 2 tabs BID, Jardiance 25 mg daily; taking trulicity 3 mg wkly w/o issues.      #HTN/HLD # A. Fib   - io /83; reports he ran out of lisinopril few days ago.   - takes lisinopril 20 mg daily & metoprolol  mg in am and 50 mg in PM (BB managed by [facility])   - takes atorva 10 mg daily; UTD on refills.   - he takes Eliquis 5 mg bid; following with cards    Past Medical, Surgical, and Family History reviewed and updated in chart.    Reviewed all medications by prescribing practitioner or clinical pharmacist (such as prescriptions, OTCs, herbal therapies and supplements) and documented in the medical record.    HPI  #HM stuffs  Screening tests:  - Colonoscopy (age 45-75): N/A, no bowel issues.   - PSA (age 50 and older): Ordered  - Lipid profile: UTD     Primary prevention:  - Flu shot: UTD  - RSV (age > 60): UTD  - COVID vaccines: declined   - Tdap shot: UTD  - Shingles shot (age >50): UTD  - Prevnar 20: UTD   - Statin (age 40-65 or high risk): taking      Counseling:   - ETOH (age>18):  yasmin beer  - Smoking: none      Patient Care Team:  Ismael Myers MD as PCP - General (Family Medicine)  Ismael Myers MD as PCP - [facility] Attributed Provider  Elvi Howard, PharmD as Pharmacist (Pharmacy)  Roxanne Morrell MD as Consulting Physician (Hematology and Oncology)  "    Review of Systems   Constitutional:  Negative for chills, fatigue, fever and unexpected weight change.   HENT: Negative.     Respiratory:  Negative for cough, shortness of breath and wheezing.    Cardiovascular:  Negative for chest pain, palpitations and leg swelling.   Gastrointestinal:  Negative for abdominal pain, constipation, diarrhea, nausea and vomiting.   Musculoskeletal: Negative.    Skin:  Negative for color change and rash.   Neurological:  Negative for dizziness and headaches.   Psychiatric/Behavioral:  Negative for behavioral problems and confusion.        Objective   Vitals:  /83 (BP Location: Right arm, Patient Position: Sitting, BP Cuff Size: Large adult)   Pulse 65   Temp 35.9 °C (96.6 °F) (Temporal)   Resp 16   Ht 1.816 m (5' 11.5\")   Wt 129 kg (284 lb)   SpO2 99%   BMI 39.06 kg/m²       Physical Exam  Vitals and nursing note reviewed.   Constitutional:       Appearance: Normal appearance. He is obese.   HENT:      Right Ear: Tympanic membrane normal.      Left Ear: Tympanic membrane normal.   Eyes:      Extraocular Movements: Extraocular movements intact.      Pupils: Pupils are equal, round, and reactive to light.   Cardiovascular:      Rate and Rhythm: Normal rate and regular rhythm.      Pulses: Normal pulses.      Heart sounds: Normal heart sounds.   Pulmonary:      Effort: Pulmonary effort is normal. No respiratory distress.      Breath sounds: Normal breath sounds.   Abdominal:      General: Abdomen is flat. Bowel sounds are normal.      Palpations: Abdomen is soft.   Musculoskeletal:         General: Normal range of motion.   Neurological:      General: No focal deficit present.      Mental Status: He is alert and oriented to person, place, and time.      Cranial Nerves: No cranial nerve deficit.      Sensory: No sensory deficit.   Psychiatric:         Mood and Affect: Mood normal.         Behavior: Behavior normal.       Assessment & Plan  Primary hypertension  BP remains " slightly elevated likely from running out of lisinopril.  Restart lisinopril 20 mg daily as below.  Continue following with cardiologist as scheduled.  Orders:    lisinopril 20 mg tablet; Take 1 tablet (20 mg) by mouth once daily.    Eczema, unspecified type  Overall exam was stable, his slight worsening of pruritus was likely from dry skin, advised moisturizing lotion daily as possible and continue Lotrisone as needed.  Offered seeing dermatologist but declined for now.  Orders:    clotrimazole-betamethasone (Lotrisone) cream; Apply 1 Application topically 2 times a day. X 10 days then as needed.    Type 2 diabetes mellitus without complication, without long-term current use of insulin (Multi)  A1c remained stable at 8.4; highly encouraged to cut down on carb and portion size control.  Continue current meds.  Repeat A1c and other labs prior to next visit.  Orders:    dulaglutide (Trulicity) 3 mg/0.5 mL injection; Inject 3 mg under the skin 1 (one) time per week.    metFORMIN (Glucophage) 500 mg tablet; Take 2 tablets (1,000 mg) by mouth 2 times daily (morning and late afternoon).    empagliflozin (Jardiance) 25 mg; Take 1 tablet (25 mg) by mouth once daily.    Hemoglobin A1c; Future    Albumin-Creatinine Ratio, Urine Random; Future    Routine general medical examination at health care facility  # [facility] wellness #  visit   - Discussed ACP with pt; will work on POA/living will paper work   - Immunization per emr: Up-to-date  - Age appropriate screenings as listed above in [facility] summary: Added PSA  - refer [facility] summary above for detail  - BW ordered as listed in emr     Orders:    1 Year Follow Up In Primary Care - Wellness Exam; Future    Screening for prostate cancer    Orders:    PSA screen; Future    ACP (advance care planning)         Atrial fibrillation, unspecified type (Multi)  Remains controlled, continue following with cardiology.       Other hyperlipidemia         Body mass index (BMI) 40.0-44.9, adult  (Multi)  Recommend following low calorie diet and daily exercise as tolerated.              Advance Directives Discussion  16 - 20 minutes were spent discussing Advanced Care Planning (including a Living Will, Medical Power Of , as well as specific end of life choices and/or directives). The details of that discussion were documented in Advanced Directives Discussion section of the medical record.      RTC 3 months for follow-up    This note was partially generated using the Dragon Voice recognition system. There may be some incorrect wording, spelling and/or spelling errors or punctuation errors that were not corrected prior to committing the note to the medical record.      Ismael Myers MD    [place]

## 2025-02-12 ENCOUNTER — LAB (OUTPATIENT)
Dept: LAB | Facility: HOSPITAL | Age: 85
End: 2025-02-12
Payer: MEDICARE

## 2025-02-12 DIAGNOSIS — Z51.12 ENCOUNTER FOR ANTINEOPLASTIC IMMUNOTHERAPY: ICD-10-CM

## 2025-02-12 DIAGNOSIS — C77.9 MALIGNANT MELANOMA METASTATIC TO LYMPH NODE (MULTI): ICD-10-CM

## 2025-02-12 LAB
ALBUMIN SERPL BCP-MCNC: 4.5 G/DL (ref 3.4–5)
ALP SERPL-CCNC: 107 U/L (ref 33–136)
ALT SERPL W P-5'-P-CCNC: 13 U/L (ref 10–52)
ANION GAP SERPL CALC-SCNC: 10 MMOL/L (ref 10–20)
AST SERPL W P-5'-P-CCNC: 22 U/L (ref 9–39)
BASOPHILS # BLD AUTO: 0.03 X10*3/UL (ref 0–0.1)
BASOPHILS NFR BLD AUTO: 0.5 %
BILIRUB SERPL-MCNC: 0.9 MG/DL (ref 0–1.2)
BUN SERPL-MCNC: 16 MG/DL (ref 6–23)
CALCIUM SERPL-MCNC: 9.3 MG/DL (ref 8.6–10.3)
CHLORIDE SERPL-SCNC: 103 MMOL/L (ref 98–107)
CO2 SERPL-SCNC: 27 MMOL/L (ref 21–32)
CORTIS AM PEAK SERPL-MSCNC: 11.1 UG/DL (ref 5–20)
CREAT SERPL-MCNC: 1.18 MG/DL (ref 0.5–1.3)
EGFRCR SERPLBLD CKD-EPI 2021: 62 ML/MIN/1.73M*2
EOSINOPHIL # BLD AUTO: 0.25 X10*3/UL (ref 0–0.4)
EOSINOPHIL NFR BLD AUTO: 3.8 %
ERYTHROCYTE [DISTWIDTH] IN BLOOD BY AUTOMATED COUNT: 13.6 % (ref 11.5–14.5)
GLUCOSE SERPL-MCNC: 158 MG/DL (ref 74–99)
HCT VFR BLD AUTO: 48.4 % (ref 41–52)
HGB BLD-MCNC: 16 G/DL (ref 13.5–17.5)
IMM GRANULOCYTES # BLD AUTO: 0.03 X10*3/UL (ref 0–0.5)
IMM GRANULOCYTES NFR BLD AUTO: 0.5 % (ref 0–0.9)
LYMPHOCYTES # BLD AUTO: 2.12 X10*3/UL (ref 0.8–3)
LYMPHOCYTES NFR BLD AUTO: 31.9 %
MCH RBC QN AUTO: 30.1 PG (ref 26–34)
MCHC RBC AUTO-ENTMCNC: 33.1 G/DL (ref 32–36)
MCV RBC AUTO: 91 FL (ref 80–100)
MONOCYTES # BLD AUTO: 0.57 X10*3/UL (ref 0.05–0.8)
MONOCYTES NFR BLD AUTO: 8.6 %
NEUTROPHILS # BLD AUTO: 3.64 X10*3/UL (ref 1.6–5.5)
NEUTROPHILS NFR BLD AUTO: 54.7 %
PLATELET # BLD AUTO: 201 X10*3/UL (ref 150–450)
POTASSIUM SERPL-SCNC: 4.2 MMOL/L (ref 3.5–5.3)
PROT SERPL-MCNC: 7.4 G/DL (ref 6.4–8.2)
RBC # BLD AUTO: 5.32 X10*6/UL (ref 4.5–5.9)
SODIUM SERPL-SCNC: 136 MMOL/L (ref 136–145)
T4 FREE SERPL-MCNC: 0.52 NG/DL (ref 0.61–1.12)
TSH SERPL-ACNC: 38.48 MIU/L (ref 0.44–3.98)
WBC # BLD AUTO: 6.6 X10*3/UL (ref 4.4–11.3)

## 2025-02-12 PROCEDURE — 84439 ASSAY OF FREE THYROXINE: CPT

## 2025-02-12 PROCEDURE — 82533 TOTAL CORTISOL: CPT | Mod: PORLAB

## 2025-02-12 PROCEDURE — 84443 ASSAY THYROID STIM HORMONE: CPT

## 2025-02-12 PROCEDURE — 36415 COLL VENOUS BLD VENIPUNCTURE: CPT

## 2025-02-12 PROCEDURE — 84075 ASSAY ALKALINE PHOSPHATASE: CPT

## 2025-02-12 PROCEDURE — 85025 COMPLETE CBC W/AUTO DIFF WBC: CPT

## 2025-02-12 PROCEDURE — 82024 ASSAY OF ACTH: CPT

## 2025-02-13 DIAGNOSIS — E03.9 HYPOTHYROIDISM (ACQUIRED): Primary | ICD-10-CM

## 2025-02-13 RX ORDER — LEVOTHYROXINE SODIUM 50 UG/1
50 TABLET ORAL DAILY
Qty: 30 TABLET | Refills: 11 | Status: SHIPPED | OUTPATIENT
Start: 2025-02-13 | End: 2026-02-13

## 2025-02-14 ENCOUNTER — INFUSION (OUTPATIENT)
Dept: HEMATOLOGY/ONCOLOGY | Facility: CLINIC | Age: 85
End: 2025-02-14
Payer: MEDICARE

## 2025-02-14 VITALS
SYSTOLIC BLOOD PRESSURE: 163 MMHG | BODY MASS INDEX: 39.41 KG/M2 | TEMPERATURE: 97.2 F | OXYGEN SATURATION: 98 % | DIASTOLIC BLOOD PRESSURE: 91 MMHG | HEIGHT: 71 IN | RESPIRATION RATE: 16 BRPM | WEIGHT: 281.5 LBS | HEART RATE: 70 BPM

## 2025-02-14 DIAGNOSIS — Z51.12 ENCOUNTER FOR ANTINEOPLASTIC IMMUNOTHERAPY: ICD-10-CM

## 2025-02-14 DIAGNOSIS — C77.9 MALIGNANT MELANOMA METASTATIC TO LYMPH NODE (MULTI): ICD-10-CM

## 2025-02-14 LAB — ACTH PLAS-MCNC: 21.3 PG/ML (ref 7.2–63.3)

## 2025-02-14 PROCEDURE — 2500000004 HC RX 250 GENERAL PHARMACY W/ HCPCS (ALT 636 FOR OP/ED): Performed by: INTERNAL MEDICINE

## 2025-02-14 PROCEDURE — 96413 CHEMO IV INFUSION 1 HR: CPT

## 2025-02-14 RX ORDER — HEPARIN 100 UNIT/ML
500 SYRINGE INTRAVENOUS AS NEEDED
OUTPATIENT
Start: 2025-02-14

## 2025-02-14 RX ORDER — PROCHLORPERAZINE MALEATE 10 MG
10 TABLET ORAL EVERY 6 HOURS PRN
Status: DISCONTINUED | OUTPATIENT
Start: 2025-02-14 | End: 2025-02-14 | Stop reason: HOSPADM

## 2025-02-14 RX ORDER — PROCHLORPERAZINE EDISYLATE 5 MG/ML
10 INJECTION INTRAMUSCULAR; INTRAVENOUS EVERY 6 HOURS PRN
Status: DISCONTINUED | OUTPATIENT
Start: 2025-02-14 | End: 2025-02-14 | Stop reason: HOSPADM

## 2025-02-14 RX ORDER — HEPARIN SODIUM,PORCINE/PF 10 UNIT/ML
50 SYRINGE (ML) INTRAVENOUS AS NEEDED
OUTPATIENT
Start: 2025-02-14

## 2025-02-14 RX ORDER — EPINEPHRINE 0.3 MG/.3ML
0.3 INJECTION SUBCUTANEOUS EVERY 5 MIN PRN
Status: DISCONTINUED | OUTPATIENT
Start: 2025-02-14 | End: 2025-02-14 | Stop reason: HOSPADM

## 2025-02-14 RX ORDER — DIPHENHYDRAMINE HYDROCHLORIDE 50 MG/ML
50 INJECTION INTRAMUSCULAR; INTRAVENOUS AS NEEDED
Status: DISCONTINUED | OUTPATIENT
Start: 2025-02-14 | End: 2025-02-14 | Stop reason: HOSPADM

## 2025-02-14 RX ORDER — ALBUTEROL SULFATE 0.83 MG/ML
3 SOLUTION RESPIRATORY (INHALATION) AS NEEDED
Status: DISCONTINUED | OUTPATIENT
Start: 2025-02-14 | End: 2025-02-14 | Stop reason: HOSPADM

## 2025-02-14 RX ORDER — FAMOTIDINE 10 MG/ML
20 INJECTION INTRAVENOUS ONCE AS NEEDED
Status: DISCONTINUED | OUTPATIENT
Start: 2025-02-14 | End: 2025-02-14 | Stop reason: HOSPADM

## 2025-02-14 RX ADMIN — SODIUM CHLORIDE 480 MG: 9 INJECTION, SOLUTION INTRAVENOUS at 11:45

## 2025-02-14 ASSESSMENT — PAIN SCALES - GENERAL: PAINLEVEL_OUTOF10: 0-NO PAIN

## 2025-02-14 NOTE — PROGRESS NOTES
Pt arrived awake, alert, oriented, no apparent distress with unlabored breaths. Pt reports feeling well today without s/sx of illness. Pt here for day 1, cycle 11 in which he received and tolerated well as of thus far. Dr Morrell sent Rx for levothyroxine for pt to begin taking, pt verbalized understanding. Pt with next appointment set for 3/14/25, no further questions or concerns, discharged in stable condition.

## 2025-02-14 NOTE — SIGNIFICANT EVENT

## 2025-03-12 ENCOUNTER — LAB (OUTPATIENT)
Dept: LAB | Facility: HOSPITAL | Age: 85
End: 2025-03-12
Payer: MEDICARE

## 2025-03-12 ENCOUNTER — TELEPHONE (OUTPATIENT)
Dept: PRIMARY CARE | Facility: CLINIC | Age: 85
End: 2025-03-12
Payer: MEDICARE

## 2025-03-12 DIAGNOSIS — C77.9 MALIGNANT MELANOMA METASTATIC TO LYMPH NODE (MULTI): ICD-10-CM

## 2025-03-12 DIAGNOSIS — Z51.12 ENCOUNTER FOR ANTINEOPLASTIC IMMUNOTHERAPY: ICD-10-CM

## 2025-03-12 LAB
ALBUMIN SERPL BCP-MCNC: 4.3 G/DL (ref 3.4–5)
ALP SERPL-CCNC: 90 U/L (ref 33–136)
ALT SERPL W P-5'-P-CCNC: 18 U/L (ref 10–52)
ANION GAP SERPL CALC-SCNC: 10 MMOL/L (ref 10–20)
AST SERPL W P-5'-P-CCNC: 23 U/L (ref 9–39)
BASOPHILS # BLD AUTO: 0.03 X10*3/UL (ref 0–0.1)
BASOPHILS NFR BLD AUTO: 0.5 %
BILIRUB SERPL-MCNC: 0.8 MG/DL (ref 0–1.2)
BUN SERPL-MCNC: 19 MG/DL (ref 6–23)
CALCIUM SERPL-MCNC: 9.1 MG/DL (ref 8.6–10.3)
CHLORIDE SERPL-SCNC: 105 MMOL/L (ref 98–107)
CO2 SERPL-SCNC: 26 MMOL/L (ref 21–32)
CORTIS AM PEAK SERPL-MSCNC: 9.3 UG/DL (ref 5–20)
CREAT SERPL-MCNC: 1.16 MG/DL (ref 0.5–1.3)
EGFRCR SERPLBLD CKD-EPI 2021: 63 ML/MIN/1.73M*2
EOSINOPHIL # BLD AUTO: 0.21 X10*3/UL (ref 0–0.4)
EOSINOPHIL NFR BLD AUTO: 3.4 %
ERYTHROCYTE [DISTWIDTH] IN BLOOD BY AUTOMATED COUNT: 14.3 % (ref 11.5–14.5)
GLUCOSE SERPL-MCNC: 199 MG/DL (ref 74–99)
HCT VFR BLD AUTO: 45.4 % (ref 41–52)
HGB BLD-MCNC: 14.7 G/DL (ref 13.5–17.5)
IMM GRANULOCYTES # BLD AUTO: 0.02 X10*3/UL (ref 0–0.5)
IMM GRANULOCYTES NFR BLD AUTO: 0.3 % (ref 0–0.9)
LYMPHOCYTES # BLD AUTO: 1.69 X10*3/UL (ref 0.8–3)
LYMPHOCYTES NFR BLD AUTO: 27.5 %
MCH RBC QN AUTO: 30 PG (ref 26–34)
MCHC RBC AUTO-ENTMCNC: 32.4 G/DL (ref 32–36)
MCV RBC AUTO: 93 FL (ref 80–100)
MONOCYTES # BLD AUTO: 0.53 X10*3/UL (ref 0.05–0.8)
MONOCYTES NFR BLD AUTO: 8.6 %
NEUTROPHILS # BLD AUTO: 3.66 X10*3/UL (ref 1.6–5.5)
NEUTROPHILS NFR BLD AUTO: 59.7 %
PLATELET # BLD AUTO: 177 X10*3/UL (ref 150–450)
POTASSIUM SERPL-SCNC: 4.1 MMOL/L (ref 3.5–5.3)
PROT SERPL-MCNC: 6.8 G/DL (ref 6.4–8.2)
RBC # BLD AUTO: 4.9 X10*6/UL (ref 4.5–5.9)
SODIUM SERPL-SCNC: 137 MMOL/L (ref 136–145)
T4 FREE SERPL-MCNC: 0.64 NG/DL (ref 0.61–1.12)
TSH SERPL-ACNC: 31.74 MIU/L (ref 0.44–3.98)
WBC # BLD AUTO: 6.1 X10*3/UL (ref 4.4–11.3)

## 2025-03-12 PROCEDURE — 82024 ASSAY OF ACTH: CPT

## 2025-03-12 PROCEDURE — 84443 ASSAY THYROID STIM HORMONE: CPT

## 2025-03-12 PROCEDURE — 84439 ASSAY OF FREE THYROXINE: CPT

## 2025-03-12 PROCEDURE — 80053 COMPREHEN METABOLIC PANEL: CPT

## 2025-03-12 PROCEDURE — 36415 COLL VENOUS BLD VENIPUNCTURE: CPT

## 2025-03-12 PROCEDURE — 85025 COMPLETE CBC W/AUTO DIFF WBC: CPT

## 2025-03-12 PROCEDURE — 82533 TOTAL CORTISOL: CPT | Mod: PORLAB

## 2025-03-12 NOTE — TELEPHONE ENCOUNTER
Patient called in requesting a refill on his Atorvastatin 10 mg tablet. Patient states he no longer see's his previous doctor and needs Dr. Myers to call this in. The patient states that they use [facility]

## 2025-03-14 ENCOUNTER — INFUSION (OUTPATIENT)
Dept: HEMATOLOGY/ONCOLOGY | Facility: CLINIC | Age: 85
End: 2025-03-14
Payer: MEDICARE

## 2025-03-14 VITALS
BODY MASS INDEX: 40.6 KG/M2 | HEIGHT: 71 IN | WEIGHT: 290 LBS | OXYGEN SATURATION: 97 % | HEART RATE: 68 BPM | SYSTOLIC BLOOD PRESSURE: 154 MMHG | DIASTOLIC BLOOD PRESSURE: 88 MMHG | RESPIRATION RATE: 16 BRPM | TEMPERATURE: 97.2 F

## 2025-03-14 DIAGNOSIS — C77.9 MALIGNANT MELANOMA METASTATIC TO LYMPH NODE (MULTI): ICD-10-CM

## 2025-03-14 DIAGNOSIS — Z51.12 ENCOUNTER FOR ANTINEOPLASTIC IMMUNOTHERAPY: ICD-10-CM

## 2025-03-14 LAB — ACTH PLAS-MCNC: 19.8 PG/ML (ref 7.2–63.3)

## 2025-03-14 PROCEDURE — 2500000004 HC RX 250 GENERAL PHARMACY W/ HCPCS (ALT 636 FOR OP/ED): Performed by: INTERNAL MEDICINE

## 2025-03-14 PROCEDURE — 96413 CHEMO IV INFUSION 1 HR: CPT

## 2025-03-14 RX ORDER — HEPARIN SODIUM,PORCINE/PF 10 UNIT/ML
50 SYRINGE (ML) INTRAVENOUS AS NEEDED
OUTPATIENT
Start: 2025-03-14

## 2025-03-14 RX ORDER — HEPARIN 100 UNIT/ML
500 SYRINGE INTRAVENOUS AS NEEDED
OUTPATIENT
Start: 2025-03-14

## 2025-03-14 RX ADMIN — SODIUM CHLORIDE 480 MG: 9 INJECTION, SOLUTION INTRAVENOUS at 11:36

## 2025-03-14 ASSESSMENT — PAIN SCALES - GENERAL: PAINLEVEL_OUTOF10: 0-NO PAIN

## 2025-03-14 NOTE — PROGRESS NOTES
Patient presents for Nivolumab treatment,  has no complaints alert and oriented x 4. PIV placed per orders. Patient meets parameters for treatment. Patient tolerated treatment well, no reaction noted. After treatment, PIV flushed and removed per practice policy and procedure, site care given with gauze, tape and coban. Pt tolerated procedure well. Patient feels well and has no complaints, vital signs stable. Patient discharged in stable condition with no further needs.

## 2025-03-14 NOTE — SIGNIFICANT EVENT

## 2025-04-09 ENCOUNTER — OFFICE VISIT (OUTPATIENT)
Dept: HEMATOLOGY/ONCOLOGY | Facility: CLINIC | Age: 85
End: 2025-04-09
Payer: MEDICARE

## 2025-04-09 VITALS
HEART RATE: 62 BPM | RESPIRATION RATE: 16 BRPM | DIASTOLIC BLOOD PRESSURE: 85 MMHG | BODY MASS INDEX: 41.4 KG/M2 | TEMPERATURE: 97.7 F | SYSTOLIC BLOOD PRESSURE: 140 MMHG | WEIGHT: 295.75 LBS | OXYGEN SATURATION: 98 % | HEIGHT: 71 IN

## 2025-04-09 DIAGNOSIS — Z51.12 ENCOUNTER FOR ANTINEOPLASTIC IMMUNOTHERAPY: ICD-10-CM

## 2025-04-09 DIAGNOSIS — C77.9 MALIGNANT MELANOMA METASTATIC TO LYMPH NODE (MULTI): ICD-10-CM

## 2025-04-09 LAB
ALBUMIN SERPL BCP-MCNC: 4.2 G/DL (ref 3.4–5)
ALP SERPL-CCNC: 89 U/L (ref 33–136)
ALT SERPL W P-5'-P-CCNC: 11 U/L (ref 10–52)
ANION GAP SERPL CALC-SCNC: 10 MMOL/L (ref 10–20)
AST SERPL W P-5'-P-CCNC: 19 U/L (ref 9–39)
BASOPHILS # BLD AUTO: 0.03 X10*3/UL (ref 0–0.1)
BASOPHILS NFR BLD AUTO: 0.4 %
BILIRUB SERPL-MCNC: 0.6 MG/DL (ref 0–1.2)
BUN SERPL-MCNC: 18 MG/DL (ref 6–23)
CALCIUM SERPL-MCNC: 8.6 MG/DL (ref 8.6–10.3)
CHLORIDE SERPL-SCNC: 103 MMOL/L (ref 98–107)
CO2 SERPL-SCNC: 27 MMOL/L (ref 21–32)
CORTIS AM PEAK SERPL-MSCNC: 12 UG/DL (ref 5–20)
CREAT SERPL-MCNC: 1.18 MG/DL (ref 0.5–1.3)
EGFRCR SERPLBLD CKD-EPI 2021: 62 ML/MIN/1.73M*2
EOSINOPHIL # BLD AUTO: 0.25 X10*3/UL (ref 0–0.4)
EOSINOPHIL NFR BLD AUTO: 3.7 %
ERYTHROCYTE [DISTWIDTH] IN BLOOD BY AUTOMATED COUNT: 13.5 % (ref 11.5–14.5)
GLUCOSE SERPL-MCNC: 155 MG/DL (ref 74–99)
HCT VFR BLD AUTO: 43.5 % (ref 41–52)
HGB BLD-MCNC: 14.1 G/DL (ref 13.5–17.5)
IMM GRANULOCYTES # BLD AUTO: 0.02 X10*3/UL (ref 0–0.5)
IMM GRANULOCYTES NFR BLD AUTO: 0.3 % (ref 0–0.9)
LYMPHOCYTES # BLD AUTO: 1.71 X10*3/UL (ref 0.8–3)
LYMPHOCYTES NFR BLD AUTO: 25.3 %
MCH RBC QN AUTO: 29.9 PG (ref 26–34)
MCHC RBC AUTO-ENTMCNC: 32.4 G/DL (ref 32–36)
MCV RBC AUTO: 92 FL (ref 80–100)
MONOCYTES # BLD AUTO: 0.57 X10*3/UL (ref 0.05–0.8)
MONOCYTES NFR BLD AUTO: 8.4 %
NEUTROPHILS # BLD AUTO: 4.19 X10*3/UL (ref 1.6–5.5)
NEUTROPHILS NFR BLD AUTO: 61.9 %
PLATELET # BLD AUTO: 172 X10*3/UL (ref 150–450)
POTASSIUM SERPL-SCNC: 4.5 MMOL/L (ref 3.5–5.3)
PROT SERPL-MCNC: 6.6 G/DL (ref 6.4–8.2)
RBC # BLD AUTO: 4.72 X10*6/UL (ref 4.5–5.9)
SODIUM SERPL-SCNC: 135 MMOL/L (ref 136–145)
T4 FREE SERPL-MCNC: 0.54 NG/DL (ref 0.61–1.12)
TSH SERPL-ACNC: 37.42 MIU/L (ref 0.44–3.98)
WBC # BLD AUTO: 6.8 X10*3/UL (ref 4.4–11.3)

## 2025-04-09 PROCEDURE — 36415 COLL VENOUS BLD VENIPUNCTURE: CPT | Performed by: INTERNAL MEDICINE

## 2025-04-09 PROCEDURE — 1126F AMNT PAIN NOTED NONE PRSNT: CPT | Performed by: INTERNAL MEDICINE

## 2025-04-09 PROCEDURE — 1160F RVW MEDS BY RX/DR IN RCRD: CPT | Performed by: INTERNAL MEDICINE

## 2025-04-09 PROCEDURE — 85025 COMPLETE CBC W/AUTO DIFF WBC: CPT | Performed by: INTERNAL MEDICINE

## 2025-04-09 PROCEDURE — 99214 OFFICE O/P EST MOD 30 MIN: CPT | Performed by: INTERNAL MEDICINE

## 2025-04-09 PROCEDURE — 84439 ASSAY OF FREE THYROXINE: CPT | Performed by: INTERNAL MEDICINE

## 2025-04-09 PROCEDURE — 82024 ASSAY OF ACTH: CPT | Performed by: INTERNAL MEDICINE

## 2025-04-09 PROCEDURE — 82533 TOTAL CORTISOL: CPT | Mod: PORLAB | Performed by: INTERNAL MEDICINE

## 2025-04-09 PROCEDURE — 80053 COMPREHEN METABOLIC PANEL: CPT | Performed by: INTERNAL MEDICINE

## 2025-04-09 PROCEDURE — 1159F MED LIST DOCD IN RCRD: CPT | Performed by: INTERNAL MEDICINE

## 2025-04-09 PROCEDURE — 84443 ASSAY THYROID STIM HORMONE: CPT | Performed by: INTERNAL MEDICINE

## 2025-04-09 PROCEDURE — 1123F ACP DISCUSS/DSCN MKR DOCD: CPT | Performed by: INTERNAL MEDICINE

## 2025-04-09 ASSESSMENT — PAIN SCALES - GENERAL: PAINLEVEL_OUTOF10: 0-NO PAIN

## 2025-04-09 NOTE — PROGRESS NOTES
Maria Ines OROURKE. Elysia  Male, 84 y.o., 1940  MRN: [id number]    Patient Visit Information:   Visit Type: Follow Up Visit      Cancer History:   Treatment Synopsis:    1.  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      This time patient presented with a left parotid gland mass      February 9, 2023 CAT scan of the neck did show 1.5 cm mass arising from or adjacent to the superficial lobe of the left parotid..     March 1, 2023, left parotid gland mass biopsy positive for melanoma.  Preauricular skin biopsy positive for invasive squamous cell carcinoma extending to deep margin      ENT evaluation done        April 16, 2023, PET scan, slight abnormal metabolic activity of left parotid gland seen, no evidence of metastatic disease      MRI brain negative for metastatic disease      May 5,   2023, status post left parotidectomy and left neck dissection      Final pathology did show metastatic melanoma measuring 2 cm surgical margin 1 mm and level 2, level 3, lymph node dissection, no evidence of lymph node metastatic disease, total 42 lymph node examined.      Current treatment, pembrolizumab, started June 26, 2023  Status post 5 doses of pembrolizumab, stopped in November 2023 because of acute arthritis   5/10/24 , nivolumab started   6/7/24 , c2  7/5/24 , c3  8/2/24 , c4  11/22/24 , c8  2/14/25 , c11  4/11/25 , c13  History of Present Illness:      ID Statement:    MARIA INES MORTON is a 82 year old Male        Chief Complaint: Melanoma of left parotid gland   Interval History:    4/9/25      Metastatic melanoma, current treatment pembrolizumab, after second dose of pembrolizumab patient developed severe swelling of both hands with arthritis and severe muscular pain.  Pembrolizumab was stopped.  Patient still complaining of joint pain shoulder pain and swelling of small joint, today patient comes for follow-up visit.     During last visit patient noticed a small purpleish color nodules of the left neck and nivolumab was started on May 10, 2024.  So far it well-tolerated.  Patient complaining some arthritis, no chest pain no shortness of breath, no diarrhea.  Sometimes patient noticed to have left supraclavicular pain    HPI  Patient is a 82-year-old gentleman with history of hypertension, type 2 diabetes mellitus, hypercholesterolemia, atrial fibrillation and melanoma of left ear which  were diagnosed 2030.      This time patient presented with a left parotid gland mass      February 9, 2023 CAT scan of the neck did show 1.5 cm mass arising from or adjacent to the superficial lobe of the left parotid..     March 1, 2023, left parotid gland mass biopsy positive for melanoma.  Preauricular skin biopsy positive for invasive squamous cell carcinoma extending to deep margin      ENT evaluation done      April 16, 2023, PET scan, slight abnormal metabolic activity of left parotid gland seen, no evidence of metastatic disease      MRI brain negative for metastatic disease      May 5,   2023, status post left parotidectomy and left neck dissection      Final pathology did show metastatic melanoma measuring 2 cm surgical margin 1 mm and level 2, level 3, lymph node dissection, no evidence of lymph node metastatic disease, total 42 lymph node examined.         Medical oncology consultation is requesting for recurrent melanoma treatment.      Postoperatively patient doing very well, patient is still active, no headache and dizziness, no bony pain, no chest pain, no shortness of breath, no hemoptysis, no nausea  vomiting, no abdominal pain, no diarrhea and constipation, no dysuria or hematuria,  patient has some arthritis      PET scan, pathology MRI result discussed with patient     Review of Systems:   Review of Systems:    As mentioned above        Allergies and Intolerances:       Allergies:         No Known Allergies: Active     Outpatient Medication Profile:  * Patient Currently Takes Medications as of 07-Aug-2023 14:35 documented in Structured Notes         atorvastatin 10 mg oral tablet : Last Dose Taken:  , 0.5 tab(s) orally once a day (at bedtime)         apixaban 5 mg oral tablet: Last Dose Taken:  , 1 tab(s) orally 2 times  a day         glipiZIDE 5 mg oral tablet: Last Dose Taken:  , 1 tab(s) orally 2 times  a day         lisinopril 20 mg oral tablet: Last Dose Taken:  , 1 tab(s) orally once  a day         metFORMIN 500 mg oral tablet: Last Dose Taken:  , 2 tab(s) orally 2 times  a day         metoprolol succinate 100 mg oral tablet, extended release: Last Dose  Taken:  , 1 tab(s) orally once a day in the evening          metoprolol succinate 50 mg oral tablet, extended release: Last Dose Taken:   , 1 tab(s) orally once a day in the morning          multivitamin Multiple Vitamins oral tablet: Last Dose Taken:  , 0.5 tab(s)  orally once a day             Medical History:         Metastatic melanoma: ICD-10: C43.9, Status: Active         Metastatic melanoma: ICD-10: C43.9, Status: Active         Malignant neoplasm of parotid gland: ICD-10: C07, Status:  Active         Head and neck cancer: ICD-10: C76.0, Status: Active         Squamous cell carcinoma of skin of sideburn: ICD-10: C44.329,  Status: Active         Hypercholesterolemia: ICD-10: E78.00, Status: Active         Diabetes mellitus: ICD-10: E11.9, Status: Active         Hypertension: ICD-10: I10, Status: Active         Metastatic melanoma to parotid gland: ICD-10: C79.89, Status:  Active         Malignant melanoma of skin of left ear and external auditory  canal : ICD-10: C43.22, Status: Active     Family History: No Family History items are recorded  in the problem list.      Social History:   Social Substance History:  ·  Smoking Status unknown if ever smoked (1)            Vitals and Measurements:   Vitals: Temp: 36.5  HR: 84  RR: 16  BP: 147/93  SPO2%:   96   Measurements: HT(cm): 181.2  WT(kg): 134.4  BSA:  2.6  BMI:  40.9   Last 3 Weights & Heights: Date:                           Weight/Scale Type:                    Height:   07-Aug-2023 14:33                134.4  kg                     181.2  cm  17-Jul-2023 08:26                135  kg                     181.2  cm  26-Jun-2023 08:25                136.4  kg                     181.2  cm      Physical Exam:      Constitutional: awake/alert/oriented x3, no distress,   Eyes: PERRL, EOMI, clear sclera   ENMT: mucous membranes moist, no apparent injury,  no lesions seen   Head/Neck: Neck supple, status post left parotidectomy  and neck dissection,     well-healed surgical scar, small nodule is  noticeable just above surgical scar 1 cm which is nontender and hard and purplish color, measuring 1.5 cm   Respiratory/Thorax: Patent airways, CTAB, normal  breath sounds   Cardiovascular: Regular, rate and rhythm,   normal  S 1and S 2   Gastrointestinal: Nondistended, soft, non-tender,   , no masses palpable,  +BS,   Musculoskeletal: ROM intact, no joint swelling,   Extremities: normal extremities, finger joint swelling, some tenderness, no redness   Neurological: alert and oriented x3, intact senses,  motor, response and reflexes, normal strength   Lymphatic: No significant lymphadenopathy   Psychological: Appropriate mood and behavior   Skin: Warm and dry, no lesions, no rashes         Lab Results:        k ago 1 mo ago 2 mo ago 3 mo ago 4 mo ago 5 mo ago 6 mo ago    Glucose  74 - 99 mg/dL 199 High  158 High  188 High  176 High  201 High  200 High  173 High    Sodium  136 - 145 mmol/L 137 136 137 138 138 138 138    Potassium  3.5 - 5.3 mmol/L 4.1 4.2 4.0 4.3 4.2 4.3 4.3   Chloride  98 - 107 mmol/L 105 103 102 104 103 103 104   Bicarbonate  21 - 32 mmol/L 26 27 27 27 27 27 27   Anion Gap  10 - 20 mmol/L 10 10 12 11 12 12 11   Urea Nitrogen  6 - 23 mg/dL 19 16 15 14 17 19 20   Creatinine  0.50 - 1.30 mg/dL 1.16           6 4 wk ago 1 mo ago 2 mo ago 3 mo ago 4 mo ago 5 mo ago    WBC  4.4 - 11.3 x10*3/uL 6.8 6.1 6.6 7.1 8.2 8.5 7.2   RBC  4.50 - 5.90 x10*6/uL 4.72 4.90 5.32 5.01 4.91 5.13 4.99   Hemoglobin  13.5 - 17.5 g/dL 14.1 14.7 16.0 15.2 14.6 15.3 14.9   Hematocrit  41.0 - 52.0 % 43.5 45.4 48.4 46.2 44.9 46.5 44.9   MCV  80 - 100 fL 92 93 91 92 91 91 90   MCH  26.0 - 34.0 pg 29.9 30.0 30.1 30.3 29.7 29.8 29.9   MCHC  32.0 - 36.0 g/dL 32.4 32.4 33.1 32.9 32.5 32.9 33.2   RDW  11.5 - 14.5 % 13.5 14.3 13.6 13.8 13.6 13.6 14.0   Platelets  150 - 450 x10*3/uL 172                     ·  Results            Assessment and Plan:   Assessment:     1.  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      This time patient presented with a left parotid gland mass      February 9, 2023 CAT scan of the neck did show 1.5 cm mass arising from or adjacent to the superficial lobe of the left parotid..     March 1, 2023, left parotid gland mass biopsy positive for melanoma.  Preauricular skin biopsy positive for invasive squamous cell carcinoma extending to deep margin      ENT evaluation done        April 16, 2023, PET scan, slight abnormal metabolic activity  of left parotid gland seen, no evidence of metastatic disease      MRI brain negative for metastatic disease      May 5,   2023, status post left parotidectomy and left neck dissection      Final pathology did show metastatic melanoma measuring 2 cm surgical margin 1 mm and level 2, level 3, lymph node dissection, no evidence of lymph node metastatic disease, total 42 lymph node examined.     Current treatment, pembrolizumab, started June 26, 2023, was held in December 2023  Now patient has a recurrent melanoma on the basis of physical examination   5/10/24 , nivolumab started   6/7/24 , c2  7/5/24 , c3  8/2/24 , c4   10/22/24 , c8    4/9/25      1.  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

## 2025-04-09 NOTE — PATIENT INSTRUCTIONS
You saw Dr Morrell today in regards to your melanoma    Plan for CT scan on Chest and neck with follow up in 3 months.    Call the office at [phone] with questions or needs.  You may also contact your nurse or doctor with non-urgent issues by sending a [employer] message.

## 2025-04-11 ENCOUNTER — INFUSION (OUTPATIENT)
Dept: HEMATOLOGY/ONCOLOGY | Facility: CLINIC | Age: 85
End: 2025-04-11
Payer: MEDICARE

## 2025-04-11 VITALS
BODY MASS INDEX: 41.3 KG/M2 | SYSTOLIC BLOOD PRESSURE: 156 MMHG | HEIGHT: 71 IN | OXYGEN SATURATION: 98 % | HEART RATE: 67 BPM | WEIGHT: 295 LBS | TEMPERATURE: 97.2 F | DIASTOLIC BLOOD PRESSURE: 86 MMHG | RESPIRATION RATE: 16 BRPM

## 2025-04-11 DIAGNOSIS — E03.9 HYPOTHYROIDISM (ACQUIRED): ICD-10-CM

## 2025-04-11 DIAGNOSIS — C77.9 MALIGNANT MELANOMA METASTATIC TO LYMPH NODE (MULTI): ICD-10-CM

## 2025-04-11 DIAGNOSIS — Z51.12 ENCOUNTER FOR ANTINEOPLASTIC IMMUNOTHERAPY: ICD-10-CM

## 2025-04-11 LAB — ACTH PLAS-MCNC: 19.6 PG/ML (ref 7.2–63.3)

## 2025-04-11 PROCEDURE — 2500000004 HC RX 250 GENERAL PHARMACY W/ HCPCS (ALT 636 FOR OP/ED): Mod: JZ,TB | Performed by: INTERNAL MEDICINE

## 2025-04-11 PROCEDURE — 96413 CHEMO IV INFUSION 1 HR: CPT

## 2025-04-11 RX ORDER — FAMOTIDINE 10 MG/ML
20 INJECTION, SOLUTION INTRAVENOUS ONCE AS NEEDED
Status: DISCONTINUED | OUTPATIENT
Start: 2025-04-11 | End: 2025-04-11 | Stop reason: HOSPADM

## 2025-04-11 RX ORDER — EPINEPHRINE 0.3 MG/.3ML
0.3 INJECTION SUBCUTANEOUS EVERY 5 MIN PRN
Status: DISCONTINUED | OUTPATIENT
Start: 2025-04-11 | End: 2025-04-11 | Stop reason: HOSPADM

## 2025-04-11 RX ORDER — ALBUTEROL SULFATE 0.83 MG/ML
3 SOLUTION RESPIRATORY (INHALATION) AS NEEDED
Status: DISCONTINUED | OUTPATIENT
Start: 2025-04-11 | End: 2025-04-11 | Stop reason: HOSPADM

## 2025-04-11 RX ORDER — DIPHENHYDRAMINE HYDROCHLORIDE 50 MG/ML
50 INJECTION, SOLUTION INTRAMUSCULAR; INTRAVENOUS AS NEEDED
Status: DISCONTINUED | OUTPATIENT
Start: 2025-04-11 | End: 2025-04-11 | Stop reason: HOSPADM

## 2025-04-11 RX ORDER — PROCHLORPERAZINE MALEATE 10 MG
10 TABLET ORAL EVERY 6 HOURS PRN
Status: DISCONTINUED | OUTPATIENT
Start: 2025-04-11 | End: 2025-04-11 | Stop reason: HOSPADM

## 2025-04-11 RX ORDER — PROCHLORPERAZINE EDISYLATE 5 MG/ML
10 INJECTION INTRAMUSCULAR; INTRAVENOUS EVERY 6 HOURS PRN
Status: DISCONTINUED | OUTPATIENT
Start: 2025-04-11 | End: 2025-04-11 | Stop reason: HOSPADM

## 2025-04-11 RX ORDER — LEVOTHYROXINE SODIUM 50 UG/1
75 TABLET ORAL DAILY
Qty: 45 TABLET | Refills: 11 | Status: SHIPPED | OUTPATIENT
Start: 2025-04-11 | End: 2026-04-11

## 2025-04-11 RX ADMIN — SODIUM CHLORIDE 480 MG: 9 INJECTION, SOLUTION INTRAVENOUS at 11:29

## 2025-04-11 ASSESSMENT — PAIN SCALES - GENERAL: PAINLEVEL_OUTOF10: 0-NO PAIN

## 2025-04-11 NOTE — PROGRESS NOTES
Pt here for nivo infusion. LILLIE Contreras notified of continued elevated TSH, synthroid dose increased, pt educated. Verified pt is taking synthroid correctly. Tolerated infusion well. He is aware of plan of care, will call with further questions or concerns. Will return in 4 weeks for next infusion

## 2025-04-11 NOTE — SIGNIFICANT EVENT

## 2025-04-23 ENCOUNTER — APPOINTMENT (OUTPATIENT)
Dept: RADIOLOGY | Facility: CLINIC | Age: 85
End: 2025-04-23
Payer: MEDICARE

## 2025-04-28 ENCOUNTER — APPOINTMENT (OUTPATIENT)
Dept: HEMATOLOGY/ONCOLOGY | Facility: CLINIC | Age: 85
End: 2025-04-28
Payer: MEDICARE

## 2025-05-07 ENCOUNTER — LAB (OUTPATIENT)
Dept: LAB | Facility: HOSPITAL | Age: 85
End: 2025-05-07
Payer: MEDICARE

## 2025-05-07 DIAGNOSIS — Z51.12 ENCOUNTER FOR ANTINEOPLASTIC IMMUNOTHERAPY: ICD-10-CM

## 2025-05-07 DIAGNOSIS — C77.9 MALIGNANT MELANOMA METASTATIC TO LYMPH NODE (MULTI): ICD-10-CM

## 2025-05-07 DIAGNOSIS — E11.9 TYPE 2 DIABETES MELLITUS WITHOUT COMPLICATION, WITHOUT LONG-TERM CURRENT USE OF INSULIN: ICD-10-CM

## 2025-05-07 LAB
ALBUMIN SERPL BCP-MCNC: 4.2 G/DL (ref 3.4–5)
ALP SERPL-CCNC: 89 U/L (ref 33–136)
ALT SERPL W P-5'-P-CCNC: 11 U/L (ref 10–52)
ANION GAP SERPL CALC-SCNC: 10 MMOL/L (ref 10–20)
AST SERPL W P-5'-P-CCNC: 20 U/L (ref 9–39)
BASOPHILS # BLD AUTO: 0.04 X10*3/UL (ref 0–0.1)
BASOPHILS NFR BLD AUTO: 0.7 %
BILIRUB SERPL-MCNC: 0.7 MG/DL (ref 0–1.2)
BUN SERPL-MCNC: 16 MG/DL (ref 6–23)
CALCIUM SERPL-MCNC: 9.3 MG/DL (ref 8.6–10.3)
CHLORIDE SERPL-SCNC: 104 MMOL/L (ref 98–107)
CHOLEST SERPL-MCNC: 150 MG/DL (ref 0–199)
CHOLESTEROL/HDL RATIO: 3.1
CO2 SERPL-SCNC: 28 MMOL/L (ref 21–32)
CREAT SERPL-MCNC: 1.02 MG/DL (ref 0.5–1.3)
CREAT UR-MCNC: 38.5 MG/DL (ref 20–370)
EGFRCR SERPLBLD CKD-EPI 2021: 73 ML/MIN/1.73M*2
EOSINOPHIL # BLD AUTO: 0.28 X10*3/UL (ref 0–0.4)
EOSINOPHIL NFR BLD AUTO: 4.7 %
ERYTHROCYTE [DISTWIDTH] IN BLOOD BY AUTOMATED COUNT: 14.1 % (ref 11.5–14.5)
EST. AVERAGE GLUCOSE BLD GHB EST-MCNC: 169 MG/DL
GLUCOSE SERPL-MCNC: 152 MG/DL (ref 74–99)
HBA1C MFR BLD: 7.5 % (ref ?–5.7)
HCT VFR BLD AUTO: 44.7 % (ref 41–52)
HDLC SERPL-MCNC: 47.8 MG/DL
HGB BLD-MCNC: 14.5 G/DL (ref 13.5–17.5)
IMM GRANULOCYTES # BLD AUTO: 0.02 X10*3/UL (ref 0–0.5)
IMM GRANULOCYTES NFR BLD AUTO: 0.3 % (ref 0–0.9)
LDLC SERPL CALC-MCNC: 74 MG/DL
LYMPHOCYTES # BLD AUTO: 1.78 X10*3/UL (ref 0.8–3)
LYMPHOCYTES NFR BLD AUTO: 29.7 %
MCH RBC QN AUTO: 30.7 PG (ref 26–34)
MCHC RBC AUTO-ENTMCNC: 32.4 G/DL (ref 32–36)
MCV RBC AUTO: 95 FL (ref 80–100)
MICROALBUMIN UR-MCNC: 18.6 MG/L
MICROALBUMIN/CREAT UR: 48.3 UG/MG CREAT
MONOCYTES # BLD AUTO: 0.6 X10*3/UL (ref 0.05–0.8)
MONOCYTES NFR BLD AUTO: 10 %
NEUTROPHILS # BLD AUTO: 3.27 X10*3/UL (ref 1.6–5.5)
NEUTROPHILS NFR BLD AUTO: 54.6 %
NON HDL CHOLESTEROL: 102 MG/DL (ref 0–149)
PLATELET # BLD AUTO: 176 X10*3/UL (ref 150–450)
POTASSIUM SERPL-SCNC: 4.3 MMOL/L (ref 3.5–5.3)
PROT SERPL-MCNC: 6.9 G/DL (ref 6.4–8.2)
PSA SERPL-MCNC: 0.17 NG/ML
RBC # BLD AUTO: 4.72 X10*6/UL (ref 4.5–5.9)
SODIUM SERPL-SCNC: 138 MMOL/L (ref 136–145)
TRIGL SERPL-MCNC: 140 MG/DL (ref 0–149)
VLDL: 28 MG/DL (ref 0–40)
WBC # BLD AUTO: 6 X10*3/UL (ref 4.4–11.3)

## 2025-05-07 PROCEDURE — 82043 UR ALBUMIN QUANTITATIVE: CPT | Performed by: STUDENT IN AN ORGANIZED HEALTH CARE EDUCATION/TRAINING PROGRAM

## 2025-05-07 PROCEDURE — 80061 LIPID PANEL: CPT

## 2025-05-07 PROCEDURE — 85025 COMPLETE CBC W/AUTO DIFF WBC: CPT

## 2025-05-07 PROCEDURE — 80053 COMPREHEN METABOLIC PANEL: CPT

## 2025-05-07 PROCEDURE — 83036 HEMOGLOBIN GLYCOSYLATED A1C: CPT | Mod: PORLAB

## 2025-05-07 PROCEDURE — 84153 ASSAY OF PSA TOTAL: CPT | Mod: PORLAB | Performed by: STUDENT IN AN ORGANIZED HEALTH CARE EDUCATION/TRAINING PROGRAM

## 2025-05-09 ENCOUNTER — TELEPHONE (OUTPATIENT)
Dept: HEMATOLOGY/ONCOLOGY | Facility: CLINIC | Age: 85
End: 2025-05-09
Payer: MEDICARE

## 2025-05-09 ENCOUNTER — APPOINTMENT (OUTPATIENT)
Dept: HEMATOLOGY/ONCOLOGY | Facility: CLINIC | Age: 85
End: 2025-05-09
Payer: MEDICARE

## 2025-05-09 NOTE — TELEPHONE ENCOUNTER
Informed pt that he is scheduled for CT scan on 5/14/25 and MD wants to see him to review results and determine if additional treatment is needed before placing orders for additional treatment. Informed pt will cancel treatment today and arrange for follow up in 5/15 and r/s treatment based on CT findings and plan determined by Dr. Morrell.

## 2025-05-14 ENCOUNTER — APPOINTMENT (OUTPATIENT)
Dept: RADIOLOGY | Facility: CLINIC | Age: 85
End: 2025-05-14
Payer: MEDICARE

## 2025-05-14 ENCOUNTER — HOSPITAL ENCOUNTER (OUTPATIENT)
Dept: RADIOLOGY | Facility: CLINIC | Age: 85
Discharge: HOME | End: 2025-05-14
Payer: MEDICARE

## 2025-05-14 DIAGNOSIS — C77.9 MALIGNANT MELANOMA METASTATIC TO LYMPH NODE (MULTI): ICD-10-CM

## 2025-05-14 DIAGNOSIS — Z51.12 ENCOUNTER FOR ANTINEOPLASTIC IMMUNOTHERAPY: ICD-10-CM

## 2025-05-14 PROCEDURE — 70491 CT SOFT TISSUE NECK W/DYE: CPT

## 2025-05-14 PROCEDURE — 2550000001 HC RX 255 CONTRASTS: Mod: JZ | Performed by: INTERNAL MEDICINE

## 2025-05-14 PROCEDURE — 71260 CT THORAX DX C+: CPT

## 2025-05-14 PROCEDURE — 71260 CT THORAX DX C+: CPT | Performed by: RADIOLOGY

## 2025-05-14 RX ADMIN — IOHEXOL 75 ML: 350 INJECTION, SOLUTION INTRAVENOUS at 14:23

## 2025-05-15 ENCOUNTER — APPOINTMENT (OUTPATIENT)
Dept: PRIMARY CARE | Facility: CLINIC | Age: 85
End: 2025-05-15
Payer: MEDICARE

## 2025-05-15 ENCOUNTER — APPOINTMENT (OUTPATIENT)
Dept: HEMATOLOGY/ONCOLOGY | Facility: CLINIC | Age: 85
End: 2025-05-15
Payer: MEDICARE

## 2025-05-15 ENCOUNTER — OFFICE VISIT (OUTPATIENT)
Dept: HEMATOLOGY/ONCOLOGY | Facility: CLINIC | Age: 85
End: 2025-05-15
Payer: MEDICARE

## 2025-05-15 VITALS
DIASTOLIC BLOOD PRESSURE: 80 MMHG | HEART RATE: 58 BPM | SYSTOLIC BLOOD PRESSURE: 132 MMHG | OXYGEN SATURATION: 97 % | WEIGHT: 291.9 LBS | RESPIRATION RATE: 20 BRPM | HEIGHT: 71 IN | TEMPERATURE: 96.8 F | BODY MASS INDEX: 40.86 KG/M2

## 2025-05-15 VITALS
TEMPERATURE: 97.2 F | WEIGHT: 291.89 LBS | HEART RATE: 52 BPM | DIASTOLIC BLOOD PRESSURE: 80 MMHG | HEIGHT: 71 IN | BODY MASS INDEX: 40.86 KG/M2 | RESPIRATION RATE: 16 BRPM | OXYGEN SATURATION: 97 % | SYSTOLIC BLOOD PRESSURE: 170 MMHG

## 2025-05-15 DIAGNOSIS — I10 PRIMARY HYPERTENSION: ICD-10-CM

## 2025-05-15 DIAGNOSIS — L30.9 ECZEMA, UNSPECIFIED TYPE: ICD-10-CM

## 2025-05-15 DIAGNOSIS — C77.9 MALIGNANT MELANOMA METASTATIC TO LYMPH NODE (MULTI): ICD-10-CM

## 2025-05-15 DIAGNOSIS — C07 MALIGNANT NEOPLASM OF PAROTID GLAND (MULTI): ICD-10-CM

## 2025-05-15 DIAGNOSIS — E11.9 TYPE 2 DIABETES MELLITUS WITHOUT COMPLICATION, WITHOUT LONG-TERM CURRENT USE OF INSULIN: Primary | ICD-10-CM

## 2025-05-15 DIAGNOSIS — C43.4 MALIGNANT MELANOMA OF HEAD AND NECK (MULTI): Primary | ICD-10-CM

## 2025-05-15 DIAGNOSIS — E78.49 OTHER HYPERLIPIDEMIA: ICD-10-CM

## 2025-05-15 PROCEDURE — 1126F AMNT PAIN NOTED NONE PRSNT: CPT | Performed by: STUDENT IN AN ORGANIZED HEALTH CARE EDUCATION/TRAINING PROGRAM

## 2025-05-15 PROCEDURE — 99214 OFFICE O/P EST MOD 30 MIN: CPT | Performed by: INTERNAL MEDICINE

## 2025-05-15 PROCEDURE — 1159F MED LIST DOCD IN RCRD: CPT | Performed by: STUDENT IN AN ORGANIZED HEALTH CARE EDUCATION/TRAINING PROGRAM

## 2025-05-15 PROCEDURE — 1036F TOBACCO NON-USER: CPT | Performed by: INTERNAL MEDICINE

## 2025-05-15 PROCEDURE — 3079F DIAST BP 80-89 MM HG: CPT | Performed by: STUDENT IN AN ORGANIZED HEALTH CARE EDUCATION/TRAINING PROGRAM

## 2025-05-15 PROCEDURE — 99214 OFFICE O/P EST MOD 30 MIN: CPT | Performed by: STUDENT IN AN ORGANIZED HEALTH CARE EDUCATION/TRAINING PROGRAM

## 2025-05-15 PROCEDURE — 1036F TOBACCO NON-USER: CPT | Performed by: STUDENT IN AN ORGANIZED HEALTH CARE EDUCATION/TRAINING PROGRAM

## 2025-05-15 PROCEDURE — 3075F SYST BP GE 130 - 139MM HG: CPT | Performed by: STUDENT IN AN ORGANIZED HEALTH CARE EDUCATION/TRAINING PROGRAM

## 2025-05-15 PROCEDURE — 1159F MED LIST DOCD IN RCRD: CPT | Performed by: INTERNAL MEDICINE

## 2025-05-15 PROCEDURE — 1126F AMNT PAIN NOTED NONE PRSNT: CPT | Performed by: INTERNAL MEDICINE

## 2025-05-15 PROCEDURE — 1160F RVW MEDS BY RX/DR IN RCRD: CPT | Performed by: STUDENT IN AN ORGANIZED HEALTH CARE EDUCATION/TRAINING PROGRAM

## 2025-05-15 PROCEDURE — 1160F RVW MEDS BY RX/DR IN RCRD: CPT | Performed by: INTERNAL MEDICINE

## 2025-05-15 RX ORDER — ATORVASTATIN CALCIUM 10 MG/1
10 TABLET, FILM COATED ORAL DAILY
Qty: 90 TABLET | Refills: 1 | Status: SHIPPED | OUTPATIENT
Start: 2025-05-15

## 2025-05-15 RX ORDER — ATORVASTATIN CALCIUM 10 MG/1
10 TABLET, FILM COATED ORAL DAILY
Qty: 90 TABLET | Refills: 1 | Status: SHIPPED | OUTPATIENT
Start: 2025-05-15 | End: 2025-05-15 | Stop reason: SDUPTHER

## 2025-05-15 SDOH — ECONOMIC STABILITY: FOOD INSECURITY: WITHIN THE PAST 12 MONTHS, YOU WORRIED THAT YOUR FOOD WOULD RUN OUT BEFORE YOU GOT MONEY TO BUY MORE.: NEVER TRUE

## 2025-05-15 SDOH — ECONOMIC STABILITY: FOOD INSECURITY: WITHIN THE PAST 12 MONTHS, THE FOOD YOU BOUGHT JUST DIDN'T LAST AND YOU DIDN'T HAVE MONEY TO GET MORE.: NEVER TRUE

## 2025-05-15 ASSESSMENT — LIFESTYLE VARIABLES
HOW OFTEN DO YOU HAVE A DRINK CONTAINING ALCOHOL: NEVER
AUDIT-C TOTAL SCORE: 0
SKIP TO QUESTIONS 9-10: 1
HOW OFTEN DO YOU HAVE SIX OR MORE DRINKS ON ONE OCCASION: NEVER
HOW MANY STANDARD DRINKS CONTAINING ALCOHOL DO YOU HAVE ON A TYPICAL DAY: PATIENT DOES NOT DRINK

## 2025-05-15 ASSESSMENT — ANXIETY QUESTIONNAIRES
2. NOT BEING ABLE TO STOP OR CONTROL WORRYING: NOT AT ALL
6. BECOMING EASILY ANNOYED OR IRRITABLE: NOT AT ALL
3. WORRYING TOO MUCH ABOUT DIFFERENT THINGS: NOT AT ALL
5. BEING SO RESTLESS THAT IT IS HARD TO SIT STILL: NOT AT ALL
GAD7 TOTAL SCORE: 0
4. TROUBLE RELAXING: NOT AT ALL
7. FEELING AFRAID AS IF SOMETHING AWFUL MIGHT HAPPEN: NOT AT ALL
IF YOU CHECKED OFF ANY PROBLEMS ON THIS QUESTIONNAIRE, HOW DIFFICULT HAVE THESE PROBLEMS MADE IT FOR YOU TO DO YOUR WORK, TAKE CARE OF THINGS AT HOME, OR GET ALONG WITH OTHER PEOPLE: NOT DIFFICULT AT ALL
1. FEELING NERVOUS, ANXIOUS, OR ON EDGE: NOT AT ALL

## 2025-05-15 ASSESSMENT — ENCOUNTER SYMPTOMS
OCCASIONAL FEELINGS OF UNSTEADINESS: 0
UNEXPECTED WEIGHT CHANGE: 0
COLOR CHANGE: 0
DIZZINESS: 0
VOMITING: 0
HEADACHES: 0
ABDOMINAL PAIN: 0
COUGH: 0
CHILLS: 0
FEVER: 0
SHORTNESS OF BREATH: 0
FATIGUE: 0
CONFUSION: 0
DEPRESSION: 0
DIARRHEA: 0
PALPITATIONS: 0
NAUSEA: 0
WHEEZING: 0
CONSTIPATION: 0
LOSS OF SENSATION IN FEET: 0
MUSCULOSKELETAL NEGATIVE: 1

## 2025-05-15 ASSESSMENT — PAIN SCALES - GENERAL
PAINLEVEL_OUTOF10: 0-NO PAIN
PAINLEVEL_OUTOF10: 0-NO PAIN

## 2025-05-15 NOTE — PROGRESS NOTES
"Subjective   Patient ID: Ji Naidu is a 84 y.o. male who presents for Follow-up (3 month follow up ), itchy skin (Still having itchy skin - bumps (pimples) - mostly on back and sides of body -3 months - was told to see Dermatology but hasn't), medication (Hasn't taken Atorvastatin for the last 2 weeks - ran out of medication - feels like ok without but will go back if needing to. ), and Results (Blood work).    HPI   He is here for follow up visit. Reports he is doing well, no illness reported.     # DM2  - last A1c  8.4 (2/3/25) and recent 7.5 (5/7/25)  - takes metformin 500 mg 2 tabs BID, Jardiance 25 mg daily; taking trulicity 3 mg wkly w/o issues.      #HTN/HLD # A. Fib   - io /80  - takes lisinopril 20 mg daily & metoprolol  mg in am and 50 mg in PM (BB managed by cards)   - takes atorva 10 mg daily; UTD on refills.   - he takes Eliquis 5 mg bid; following with cards    Review of Systems   Constitutional:  Negative for chills, fatigue, fever and unexpected weight change.   HENT: Negative.     Respiratory:  Negative for cough, shortness of breath and wheezing.    Cardiovascular:  Negative for chest pain, palpitations and leg swelling.   Gastrointestinal:  Negative for abdominal pain, constipation, diarrhea, nausea and vomiting.   Musculoskeletal: Negative.    Skin:  Negative for color change and rash.   Neurological:  Negative for dizziness and headaches.   Psychiatric/Behavioral:  Negative for behavioral problems and confusion.        Objective   /80 (BP Location: Left arm, Patient Position: Sitting, BP Cuff Size: Large adult)   Pulse 58   Temp 36 °C (96.8 °F) (Temporal)   Resp 20   Ht 1.803 m (5' 11\")   Wt 132 kg (291 lb 14.4 oz)   SpO2 97%   BMI 40.71 kg/m²     Physical Exam  Vitals and nursing note reviewed.   Constitutional:       Appearance: Normal appearance. He is obese.   Cardiovascular:      Rate and Rhythm: Normal rate and regular rhythm.      Pulses: Normal pulses.      " Heart sounds: Normal heart sounds.   Pulmonary:      Effort: Pulmonary effort is normal. No respiratory distress.      Breath sounds: Normal breath sounds.   Abdominal:      General: Abdomen is flat. Bowel sounds are normal.      Palpations: Abdomen is soft.   Musculoskeletal:         General: Normal range of motion.   Neurological:      General: No focal deficit present.      Mental Status: He is alert.   Psychiatric:         Mood and Affect: Mood normal.         Behavior: Behavior normal.       Assessment/Plan   He is here for follow up visit. Overall doing okay, no major concerns today and is clinically & vitally stable. Placed referral to see derm for skin lesions. Plan as follows      # DM2  - last A1c  8.4 (2/3/25) and recent 7.5 (5/7/25), improving & close to goal   - cont metformin 500 mg 2 tabs BID, Jardiance 25 mg daily & trulicity 3 mg wkly as usual   - cont following low glycemic/diabetic diet   - added A1c for NOV      #HTN/HLD # A. Fib   - BP remains close to goal   - cont lisinopril 20 mg daily & metoprolol  mg in am and 50 mg in PM (BB managed by cards)   - cont atorva 10 mg daily; Rx refilled   - cont Eliquis 5 mg bid & following with cards as schd     Rec to cont following with heme/onc as usual for melanoma. Other chronic problems are stable; continue all medications as usual. Rx refilled.      Problem List Items Addressed This Visit    None  Visit Diagnoses         Codes      Type 2 diabetes mellitus without complication, without long-term current use of insulin    -  Primary E11.9    Relevant Medications    dulaglutide (Trulicity) 3 mg/0.5 mL injection    Other Relevant Orders    Hemoglobin A1c      Other hyperlipidemia     E78.49    Relevant Medications    atorvastatin (Lipitor) 10 mg tablet      Eczema, unspecified type     L30.9    Relevant Orders    Referral to Dermatology      Primary hypertension     I10          Rtc 3-4 mo for FU     This note was partially generated using the  [employer] Voice recognition system. There may be some incorrect wording, spelling and/or spelling errors or punctuation errors that were not corrected prior to committing the note to the medical record.      Ismael Myers MD   [facility], Family Medicine

## 2025-05-15 NOTE — PATIENT INSTRUCTIONS
Follow up visit for history of metastatic melanoma.     CT scan shows remission.     Immunotherapy will be stopped.     CT scan in 4 months.     Follow up with Dr. Morrell after PET scan to review results.     Call the office at [phone] with questions or needs.  You may also contact your nurse or doctor with non-urgent issues by sending a [employer] message.  Reminders  Medicine refills: call or send a [employer] message to request medicine refills at least 5 to 7 days before you run out.  Labs: You will need labs drawn at your follow-up doctor visit

## 2025-05-15 NOTE — PROGRESS NOTES
Ji MAX Naidu  Male, 84 y.o., 1940  MRN: [id number]    Patient Visit Information:   Visit Type: Follow Up Visit      Cancer History:   Treatment Synopsis:    1.  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      This time patient presented with a left parotid gland mass      February 9, 2023 CAT scan of the neck did show 1.5 cm mass arising from or adjacent to the superficial lobe of the left parotid..     March 1, 2023, left parotid gland mass biopsy positive for melanoma.  Preauricular skin biopsy positive for invasive squamous cell carcinoma extending to deep margin      ENT evaluation done        April 16, 2023, PET scan, slight abnormal metabolic activity of left parotid gland seen, no evidence of metastatic disease      MRI brain negative for metastatic disease      May 5,   2023, status post left parotidectomy and left neck dissection      Final pathology did show metastatic melanoma measuring 2 cm surgical margin 1 mm and level 2, level 3, lymph node dissection, no evidence of lymph node metastatic disease, total 42 lymph node examined.      Current treatment, pembrolizumab, started June 26, 2023  Status post 5 doses of pembrolizumab, stopped in November 2023 because of acute arthritis   5/10/24 , nivolumab started   6/7/24 , c2  7/5/24 , c3  8/2/24 , c4  11/22/24 , c8  2/14/25 , c11  4/11/25 , c13     5/14/25 , CT chest, small nodule of left lower lobe measuring 5 x 7 mm    5/14/25 , CT neck , no local recurrent disease  History of Present Illness:      ID Statement:    PRAVEEN  MARIA INES OROURKE is a 82 year old Male        Chief Complaint: Melanoma of left parotid gland   Interval History:    5/15/25     Metastatic melanoma, current treatment pembrolizumab, after second dose of pembrolizumab patient developed severe swelling of both hands with arthritis and severe muscular pain.  Pembrolizumab was stopped.  Patient still complaining of joint pain shoulder pain and swelling of small joint, today patient comes for follow-up visit.     During last visit patient noticed a small purpleish color nodules of the left neck and nivolumab was started on May 10, 2024.  So far it well-tolerated.  Patient complaining some arthritis, no chest pain no shortness of breath, no diarrhea.  Sometimes patient noticed to have left supraclavicular pain  CT neck and chest result discussed with the patient patient doing very well.  KENIA  Patient is a 82-year-old gentleman with history of hypertension, type 2 diabetes mellitus, hypercholesterolemia, atrial fibrillation and melanoma of left ear which  were diagnosed 2030.      This time patient presented with a left parotid gland mass      February 9, 2023 CAT scan of the neck did show 1.5 cm mass arising from or adjacent to the superficial lobe of the left parotid..     March 1, 2023, left parotid gland mass biopsy positive for melanoma.  Preauricular skin biopsy positive for invasive squamous cell carcinoma extending to deep margin      ENT evaluation done      April 16, 2023, PET scan, slight abnormal metabolic activity of left parotid gland seen, no evidence of metastatic disease      MRI brain negative for metastatic disease      May 5,   2023, status post left parotidectomy and left neck dissection      Final pathology did show metastatic melanoma measuring 2 cm surgical margin 1 mm and level 2, level 3, lymph node dissection, no evidence of lymph node metastatic disease, total 42 lymph node examined.         Medical oncology consultation is requesting for recurrent  melanoma treatment.      Postoperatively patient doing very well, patient is still active, no headache and dizziness, no bony pain, no chest pain, no shortness of breath, no hemoptysis, no nausea vomiting, no abdominal pain, no diarrhea and constipation, no dysuria or hematuria,  patient has some arthritis      PET scan, pathology MRI result discussed with patient     Review of Systems:   Review of Systems:    As mentioned above        Allergies and Intolerances:       Allergies:         No Known Allergies: Active     Outpatient Medication Profile:  * Patient Currently Takes Medications as of 07-Aug-2023 14:35 documented in Structured Notes         atorvastatin 10 mg oral tablet : Last Dose Taken:  , 0.5 tab(s) orally once a day (at bedtime)         apixaban 5 mg oral tablet: Last Dose Taken:  , 1 tab(s) orally 2 times  a day         glipiZIDE 5 mg oral tablet: Last Dose Taken:  , 1 tab(s) orally 2 times  a day         lisinopril 20 mg oral tablet: Last Dose Taken:  , 1 tab(s) orally once  a day         metFORMIN 500 mg oral tablet: Last Dose Taken:  , 2 tab(s) orally 2 times  a day         metoprolol succinate 100 mg oral tablet, extended release: Last Dose  Taken:  , 1 tab(s) orally once a day in the evening          metoprolol succinate 50 mg oral tablet, extended release: Last Dose Taken:   , 1 tab(s) orally once a day in the morning          multivitamin Multiple Vitamins oral tablet: Last Dose Taken:  , 0.5 tab(s)  orally once a day             Medical History:         Metastatic melanoma: ICD-10: C43.9, Status: Active         Metastatic melanoma: ICD-10: C43.9, Status: Active         Malignant neoplasm of parotid gland: ICD-10: C07, Status:  Active         Head and neck cancer: ICD-10: C76.0, Status: Active         Squamous cell carcinoma of skin of sideburn: ICD-10: C44.329,  Status: Active         Hypercholesterolemia: ICD-10: E78.00, Status: Active         Diabetes mellitus: ICD-10: E11.9, Status: Active          Hypertension: ICD-10: I10, Status: Active         Metastatic melanoma to parotid gland: ICD-10: C79.89, Status:  Active         Malignant melanoma of skin of left ear and external auditory canal : ICD-10: C43.22, Status: Active     Family History: No Family History items are recorded  in the problem list.      Social History:   Social Substance History:  ·  Smoking Status unknown if ever smoked (1)            Vitals and Measurements:   Vitals: Temp: 36.5  HR: 84  RR: 16  BP: 147/93  SPO2%:   96   Measurements: HT(cm): 181.2  WT(kg): 134.4  BSA:  2.6  BMI:  40.9   Last 3 Weights & Heights: Date:                           Weight/Scale Type:                    Height:   07-Aug-2023 14:33                134.4  kg                     181.2  cm  17-Jul-2023 08:26                135  kg                     181.2  cm  26-Jun-2023 08:25                136.4  kg                     181.2  cm      Physical Exam:      Constitutional: awake/alert/oriented x3, no distress,   Eyes: PERRL, EOMI, clear sclera   ENMT: mucous membranes moist, no apparent injury,  no lesions seen   Head/Neck: Neck supple, status post left parotidectomy  and neck dissection,     well-healed surgical scar, small nodule is  noticeable just above surgical scar 1 cm which is nontender and hard and purplish color, measuring 1.5 cm   Respiratory/Thorax: Patent airways, CTAB, normal  breath sounds   Cardiovascular: Regular, rate and rhythm,   normal  S 1and S 2   Gastrointestinal: Nondistended, soft, non-tender,   , no masses palpable,  +BS,   Musculoskeletal: ROM intact, no joint swelling,   Extremities: normal extremities, finger joint swelling, some tenderness, no redness   Neurological: alert and oriented x3, intact senses,  motor, response and reflexes, normal strength   Lymphatic: No significant lymphadenopathy   Psychological: Appropriate mood and behavior   Skin: Warm and dry, no lesions, no rashes         Lab Results:        k ago 1 mo ago 2 mo  ago 3 mo ago 4 mo ago 5 mo ago 6 mo ago    Glucose  74 - 99 mg/dL 199 High  158 High  188 High  176 High  201 High  200 High  173 High    Sodium  136 - 145 mmol/L 137 136 137 138 138 138 138   Potassium  3.5 - 5.3 mmol/L 4.1 4.2 4.0 4.3 4.2 4.3 4.3   Chloride  98 - 107 mmol/L 105 103 102 104 103 103 104   Bicarbonate  21 - 32 mmol/L 26 27 27 27 27 27 27   Anion Gap  10 - 20 mmol/L 10 10 12 11 12 12 11   Urea Nitrogen  6 - 23 mg/dL 19 16 15 14 17 19 20   Creatinine  0.50 - 1.30 mg/dL 1.16           6 4 wk ago 1 mo ago 2 mo ago 3 mo ago 4 mo ago 5 mo ago    WBC  4.4 - 11.3 x10*3/uL 6.8 6.1 6.6 7.1 8.2 8.5 7.2   RBC  4.50 - 5.90 x10*6/uL 4.72 4.90 5.32 5.01 4.91 5.13 4.99   Hemoglobin  13.5 - 17.5 g/dL 14.1 14.7 16.0 15.2 14.6 15.3 14.9   Hematocrit  41.0 - 52.0 % 43.5 45.4 48.4 46.2 44.9 46.5 44.9   MCV  80 - 100 fL 92 93 91 92 91 91 90   MCH  26.0 - 34.0 pg 29.9 30.0 30.1 30.3 29.7 29.8 29.9   MCHC  32.0 - 36.0 g/dL 32.4 32.4 33.1 32.9 32.5 32.9 33.2   RDW  11.5 - 14.5 % 13.5 14.3 13.6 13.8 13.6 13.6 14.0   Platelets  150 - 450 x10*3/uL 172                     ·  Results     Radiology result   5/14/25, CT chest,  There is an indeterminate 2.8 cm lesion upper pole left kidney,  likely representing a cyst but difficult to assess due to the  presence of streak artifact traversing the lesion. Ultrasound is  recommended to further assess this lesion.  2. Probable 5 x 7 mm fissural nodule left lower lobe as detailed  above. Since previous exam technique is significantly different than  current technique additional follow-up may be appropriate. Follow-up  CT in 3-6 months is suggested.  5/14/25 , CT neck, no recurrent disease  Assessment and Plan:   Assessment:     1.  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      This time patient presented with a left parotid gland mass      February 9, 2023 CAT scan of the neck did show 1.5 cm mass arising from or adjacent to the superficial lobe of the left parotid..     March 1, 2023, left parotid gland mass biopsy positive for melanoma.  Preauricular skin biopsy positive for invasive squamous cell carcinoma extending to deep margin      ENT evaluation done        April 16, 2023, PET scan, slight abnormal metabolic activity of left parotid gland seen, no evidence of metastatic disease      MRI brain negative for metastatic disease      May 5,   2023, status post left parotidectomy and left neck dissection      Final pathology did show metastatic melanoma measuring 2 cm surgical margin 1 mm and level 2, level 3, lymph node dissection, no evidence of lymph node metastatic disease, total 42 lymph node examined.     Current treatment, pembrolizumab, started June 26, 2023, was held in December 2023  Now patient has a recurrent melanoma on the basis of physical examination   5/10/24 , nivolumab started   6/7/24 , c2  7/5/24 , c3  8/2/24 , c4   10/22/24 , c8  2/14/25 , c11  4/11/25 , c13     5/14/25 , CT chest, small nodule of left lower lobe measuring 5 x 7 mm    5/14/25 , CT neck , no local recurrent disease    5/15/25      1.  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  Patient decided in favor option #2.  I will hold immunotherapy and repeat her CT chest after 4 months.  Follow-up after CT scan.     Time spent 30 minutes

## 2025-06-06 ENCOUNTER — APPOINTMENT (OUTPATIENT)
Dept: HEMATOLOGY/ONCOLOGY | Facility: CLINIC | Age: 85
End: 2025-06-06
Payer: MEDICARE

## 2025-07-02 ENCOUNTER — APPOINTMENT (OUTPATIENT)
Dept: HEMATOLOGY/ONCOLOGY | Facility: CLINIC | Age: 85
End: 2025-07-02
Payer: MEDICARE

## 2025-07-03 ENCOUNTER — APPOINTMENT (OUTPATIENT)
Dept: HEMATOLOGY/ONCOLOGY | Facility: CLINIC | Age: 85
End: 2025-07-03
Payer: MEDICARE

## 2025-07-12 DIAGNOSIS — I10 PRIMARY HYPERTENSION: ICD-10-CM

## 2025-07-14 RX ORDER — ECONAZOLE NITRATE 10 MG/G
CREAM TOPICAL
COMMUNITY
Start: 2025-07-08

## 2025-07-14 RX ORDER — LISINOPRIL 20 MG/1
20 TABLET ORAL DAILY
Qty: 90 TABLET | Refills: 0 | Status: SHIPPED | OUTPATIENT
Start: 2025-07-14

## 2025-08-07 DIAGNOSIS — E11.9 TYPE 2 DIABETES MELLITUS WITHOUT COMPLICATION, WITHOUT LONG-TERM CURRENT USE OF INSULIN: ICD-10-CM

## 2025-08-13 ENCOUNTER — APPOINTMENT (OUTPATIENT)
Dept: PRIMARY CARE | Facility: CLINIC | Age: 85
End: 2025-08-13
Payer: MEDICARE

## 2025-08-26 DIAGNOSIS — E11.9 TYPE 2 DIABETES MELLITUS WITHOUT COMPLICATION, WITHOUT LONG-TERM CURRENT USE OF INSULIN: ICD-10-CM

## 2025-08-26 RX ORDER — METFORMIN HYDROCHLORIDE 500 MG/1
TABLET ORAL
Qty: 360 TABLET | Refills: 0 | Status: SHIPPED | OUTPATIENT
Start: 2025-08-26

## 2026-01-13 ENCOUNTER — APPOINTMENT (OUTPATIENT)
Dept: PRIMARY CARE | Facility: CLINIC | Age: 86
End: 2026-01-13
Payer: MEDICARE

## 2026-02-05 ENCOUNTER — APPOINTMENT (OUTPATIENT)
Dept: PRIMARY CARE | Facility: CLINIC | Age: 86
End: 2026-02-05
Payer: MEDICARE